# Patient Record
Sex: MALE | Race: WHITE | NOT HISPANIC OR LATINO | URBAN - METROPOLITAN AREA
[De-identification: names, ages, dates, MRNs, and addresses within clinical notes are randomized per-mention and may not be internally consistent; named-entity substitution may affect disease eponyms.]

---

## 2017-12-01 ENCOUNTER — APPOINTMENT (OUTPATIENT)
Dept: URBAN - METROPOLITAN AREA CLINIC 200 | Age: 67
Setting detail: DERMATOLOGY
End: 2017-12-01

## 2017-12-01 DIAGNOSIS — L57.0 ACTINIC KERATOSIS: ICD-10-CM

## 2017-12-01 DIAGNOSIS — L57.8 OTHER SKIN CHANGES DUE TO CHRONIC EXPOSURE TO NONIONIZING RADIATION: ICD-10-CM

## 2017-12-01 DIAGNOSIS — B07.8 OTHER VIRAL WARTS: ICD-10-CM

## 2017-12-01 PROBLEM — I10 ESSENTIAL (PRIMARY) HYPERTENSION: Status: ACTIVE | Noted: 2017-12-01

## 2017-12-01 PROCEDURE — OTHER COUNSELING: OTHER

## 2017-12-01 PROCEDURE — OTHER MIPS QUALITY: OTHER

## 2017-12-01 PROCEDURE — 17110 DESTRUCT B9 LESION 1-14: CPT

## 2017-12-01 PROCEDURE — OTHER LIQUID NITROGEN: OTHER

## 2017-12-01 PROCEDURE — 99213 OFFICE O/P EST LOW 20 MIN: CPT | Mod: 25

## 2017-12-01 PROCEDURE — 17000 DESTRUCT PREMALG LESION: CPT | Mod: 59

## 2017-12-01 PROCEDURE — 17003 DESTRUCT PREMALG LES 2-14: CPT

## 2017-12-01 ASSESSMENT — LOCATION SIMPLE DESCRIPTION DERM
LOCATION SIMPLE: LEFT CHEEK
LOCATION SIMPLE: RIGHT UPPER BACK
LOCATION SIMPLE: FRONTAL SCALP
LOCATION SIMPLE: LEFT PRETIBIAL REGION

## 2017-12-01 ASSESSMENT — LOCATION ZONE DERM
LOCATION ZONE: FACE
LOCATION ZONE: LEG
LOCATION ZONE: TRUNK
LOCATION ZONE: SCALP

## 2017-12-01 ASSESSMENT — PAIN INTENSITY VAS: HOW INTENSE IS YOUR PAIN 0 BEING NO PAIN, 10 BEING THE MOST SEVERE PAIN POSSIBLE?: NO PAIN

## 2017-12-01 ASSESSMENT — LOCATION DETAILED DESCRIPTION DERM
LOCATION DETAILED: RIGHT MEDIAL UPPER BACK
LOCATION DETAILED: LEFT PROXIMAL PRETIBIAL REGION
LOCATION DETAILED: LEFT SUPERIOR LATERAL MALAR CHEEK
LOCATION DETAILED: LEFT DISTAL PRETIBIAL REGION
LOCATION DETAILED: MEDIAL FRONTAL SCALP

## 2017-12-01 NOTE — PROCEDURE: MIPS QUALITY
Detail Level: Generalized
Quality 111:Pneumonia Vaccination Status For Older Adults: Pneumococcal Vaccination Previously Received

## 2017-12-01 NOTE — PROCEDURE: LIQUID NITROGEN
Post-Care Instructions: I reviewed with the patient in detail post-care instructions. Patient is to wear sunprotection, and avoid picking at any of the treated lesions. Pt may apply Vaseline to crusted or scabbing areas.
Detail Level: Detailed
Duration Of Freeze Thaw-Cycle (Seconds): 10
Render Post-Care Instructions In Note?: no
Consent: The patient's consent was obtained including but not limited to risks of crusting, scabbing, blistering, scarring, darker or lighter pigmentary change, recurrence, incomplete removal and infection.
Number Of Freeze-Thaw Cycles: 2 freeze-thaw cycles
Include Z78.9 (Other Specified Conditions Influencing Health Status) As An Associated Diagnosis?: Yes
Medical Necessity Information: It is in your best interest to select a reason for this procedure from the list below. All of these items fulfill various CMS LCD requirements except the new and changing color options.
Medical Necessity Clause: This procedure was medically necessary because the lesions that were treated were: causing pain

## 2018-12-14 ENCOUNTER — APPOINTMENT (OUTPATIENT)
Dept: URBAN - METROPOLITAN AREA CLINIC 200 | Age: 68
Setting detail: DERMATOLOGY
End: 2018-12-14

## 2018-12-14 DIAGNOSIS — L57.8 OTHER SKIN CHANGES DUE TO CHRONIC EXPOSURE TO NONIONIZING RADIATION: ICD-10-CM

## 2018-12-14 DIAGNOSIS — L21.8 OTHER SEBORRHEIC DERMATITIS: ICD-10-CM

## 2018-12-14 DIAGNOSIS — L57.0 ACTINIC KERATOSIS: ICD-10-CM

## 2018-12-14 DIAGNOSIS — B07.8 OTHER VIRAL WARTS: ICD-10-CM

## 2018-12-14 DIAGNOSIS — L72.0 EPIDERMAL CYST: ICD-10-CM

## 2018-12-14 PROBLEM — Z85.828 PERSONAL HISTORY OF OTHER MALIGNANT NEOPLASM OF SKIN: Status: ACTIVE | Noted: 2018-12-14

## 2018-12-14 PROCEDURE — 17110 DESTRUCT B9 LESION 1-14: CPT

## 2018-12-14 PROCEDURE — OTHER COUNSELING: OTHER

## 2018-12-14 PROCEDURE — 17003 DESTRUCT PREMALG LES 2-14: CPT

## 2018-12-14 PROCEDURE — 17000 DESTRUCT PREMALG LESION: CPT | Mod: 59

## 2018-12-14 PROCEDURE — OTHER REASSURANCE: OTHER

## 2018-12-14 PROCEDURE — OTHER PRESCRIPTION: OTHER

## 2018-12-14 PROCEDURE — OTHER MIPS QUALITY: OTHER

## 2018-12-14 PROCEDURE — 99213 OFFICE O/P EST LOW 20 MIN: CPT | Mod: 25

## 2018-12-14 PROCEDURE — OTHER LIQUID NITROGEN: OTHER

## 2018-12-14 RX ORDER — HYDROCORTISONE 25 MG/ML
LOTION TOPICAL
Qty: 1 | Refills: 6 | Status: ERX | COMMUNITY
Start: 2018-12-14

## 2018-12-14 ASSESSMENT — LOCATION SIMPLE DESCRIPTION DERM
LOCATION SIMPLE: RIGHT ANTERIOR NECK
LOCATION SIMPLE: LEFT CHEEK
LOCATION SIMPLE: LEFT EAR
LOCATION SIMPLE: CHEST

## 2018-12-14 ASSESSMENT — LOCATION DETAILED DESCRIPTION DERM
LOCATION DETAILED: LEFT INFERIOR HELIX
LOCATION DETAILED: LEFT LATERAL SUBMANDIBULAR CHEEK
LOCATION DETAILED: LEFT SUPERIOR CENTRAL MALAR CHEEK
LOCATION DETAILED: RIGHT CLAVICULAR NECK
LOCATION DETAILED: LEFT TRAGUS
LOCATION DETAILED: LEFT MEDIAL SUPERIOR CHEST

## 2018-12-14 ASSESSMENT — LOCATION ZONE DERM
LOCATION ZONE: TRUNK
LOCATION ZONE: NECK
LOCATION ZONE: FACE
LOCATION ZONE: EAR

## 2018-12-14 ASSESSMENT — SEVERITY ASSESSMENT: HOW SEVERE IS THIS PATIENT'S CONDITION?: ALMOST CLEAR

## 2018-12-14 NOTE — HPI: SKIN LESION
What Type Of Note Output Would You Prefer (Optional)?: Standard Output
How Severe Is Your Skin Lesion?: mild
Is This A New Presentation, Or A Follow-Up?: Moles

## 2018-12-14 NOTE — PROCEDURE: LIQUID NITROGEN
Consent: The patient's consent was obtained including but not limited to risks of crusting, scabbing, blistering, scarring, darker or lighter pigmentary change, recurrence, incomplete removal and infection.
Number Of Freeze-Thaw Cycles: 2 freeze-thaw cycles
Add 52 Modifier (Optional): no
Post-Care Instructions: I reviewed with the patient in detail post-care instructions. Patient is to wear sunprotection, and avoid picking at any of the treated lesions. Pt may apply Vaseline to crusted or scabbing areas.
Duration Of Freeze Thaw-Cycle (Seconds): 2
Pared With?: Dermablade
Detail Level: Detailed
Medical Necessity Information: It is in your best interest to select a reason for this procedure from the list below. All of these items fulfill various CMS LCD requirements except the new and changing color options.
Include Z78.9 (Other Specified Conditions Influencing Health Status) As An Associated Diagnosis?: Yes
Medical Necessity Clause: This procedure was medically necessary because the lesions that were treated were: causing pain

## 2019-12-13 ENCOUNTER — APPOINTMENT (OUTPATIENT)
Dept: URBAN - METROPOLITAN AREA CLINIC 200 | Age: 69
Setting detail: DERMATOLOGY
End: 2019-12-18

## 2019-12-13 DIAGNOSIS — Z85.828 PERSONAL HISTORY OF OTHER MALIGNANT NEOPLASM OF SKIN: ICD-10-CM

## 2019-12-13 DIAGNOSIS — Z12.83 ENCOUNTER FOR SCREENING FOR MALIGNANT NEOPLASM OF SKIN: ICD-10-CM

## 2019-12-13 DIAGNOSIS — L21.8 OTHER SEBORRHEIC DERMATITIS: ICD-10-CM

## 2019-12-13 DIAGNOSIS — L57.0 ACTINIC KERATOSIS: ICD-10-CM

## 2019-12-13 PROCEDURE — 17000 DESTRUCT PREMALG LESION: CPT

## 2019-12-13 PROCEDURE — 99213 OFFICE O/P EST LOW 20 MIN: CPT | Mod: 25

## 2019-12-13 PROCEDURE — OTHER MIPS QUALITY: OTHER

## 2019-12-13 PROCEDURE — OTHER COUNSELING: OTHER

## 2019-12-13 PROCEDURE — OTHER REASSURANCE: OTHER

## 2019-12-13 PROCEDURE — OTHER LIQUID NITROGEN: OTHER

## 2019-12-13 PROCEDURE — 17003 DESTRUCT PREMALG LES 2-14: CPT

## 2019-12-13 PROCEDURE — OTHER PRESCRIPTION: OTHER

## 2019-12-13 RX ORDER — CLOBETASOL PROPIONATE 0.5 MG/ML
SOLUTION TOPICAL
Qty: 1 | Refills: 11 | Status: ERX | COMMUNITY
Start: 2019-12-13

## 2019-12-13 ASSESSMENT — LOCATION DETAILED DESCRIPTION DERM
LOCATION DETAILED: LEFT LATERAL FOREHEAD
LOCATION DETAILED: LEFT INFERIOR ANTERIOR NECK
LOCATION DETAILED: RIGHT SUPERIOR PARIETAL SCALP
LOCATION DETAILED: LEFT CENTRAL FRONTAL SCALP
LOCATION DETAILED: LEFT INFERIOR LATERAL MALAR CHEEK
LOCATION DETAILED: RIGHT CENTRAL PARIETAL SCALP
LOCATION DETAILED: INFERIOR THORACIC SPINE

## 2019-12-13 ASSESSMENT — LOCATION ZONE DERM
LOCATION ZONE: SCALP
LOCATION ZONE: FACE
LOCATION ZONE: NECK
LOCATION ZONE: TRUNK

## 2019-12-13 ASSESSMENT — LOCATION SIMPLE DESCRIPTION DERM
LOCATION SIMPLE: LEFT ANTERIOR NECK
LOCATION SIMPLE: LEFT SCALP
LOCATION SIMPLE: LEFT FOREHEAD
LOCATION SIMPLE: SCALP
LOCATION SIMPLE: LEFT CHEEK
LOCATION SIMPLE: UPPER BACK

## 2019-12-13 ASSESSMENT — PAIN INTENSITY VAS: HOW INTENSE IS YOUR PAIN 0 BEING NO PAIN, 10 BEING THE MOST SEVERE PAIN POSSIBLE?: NO PAIN

## 2019-12-13 NOTE — PROCEDURE: MIPS QUALITY
Detail Level: Detailed
Quality 110: Preventive Care And Screening: Influenza Immunization: Influenza Immunization previously received during influenza season
Quality 474: Zoster Vaccination Status: Shingrix vaccine was not administered for reasons documented by clinician (e.g. patient administered vaccine other than Shingrix, patient allergy or other medical reasons, patient declined or other patient reasons, vaccine not available or other system reasons)
Additional Notes: Shingles SHINGRIX vaccine not administered due to patients personal or medical reasons. Patient declined to elaborate on those reasons.

## 2019-12-13 NOTE — PROCEDURE: LIQUID NITROGEN
Render Post-Care Instructions In Note?: no
Post-Care Instructions: I reviewed with the patient in detail post-care instructions. Patient is to wear sunprotection, and avoid picking at any of the treated lesions. Pt may apply Vaseline to crusted or scabbing areas.
Duration Of Freeze Thaw-Cycle (Seconds): 2
Number Of Freeze-Thaw Cycles: 1 freeze-thaw cycle
Detail Level: Detailed
Consent: The patient's consent was obtained including but not limited to risks of crusting, scabbing, blistering, scarring, darker or lighter pigmentary change, recurrence, incomplete removal and infection.

## 2020-12-15 ENCOUNTER — APPOINTMENT (OUTPATIENT)
Dept: URBAN - METROPOLITAN AREA CLINIC 200 | Age: 70
Setting detail: DERMATOLOGY
End: 2020-12-21

## 2020-12-15 DIAGNOSIS — Z85.828 PERSONAL HISTORY OF OTHER MALIGNANT NEOPLASM OF SKIN: ICD-10-CM

## 2020-12-15 DIAGNOSIS — L57.8 OTHER SKIN CHANGES DUE TO CHRONIC EXPOSURE TO NONIONIZING RADIATION: ICD-10-CM

## 2020-12-15 DIAGNOSIS — L90.5 SCAR CONDITIONS AND FIBROSIS OF SKIN: ICD-10-CM

## 2020-12-15 DIAGNOSIS — L57.0 ACTINIC KERATOSIS: ICD-10-CM

## 2020-12-15 DIAGNOSIS — T07XXXA ABRASION OR FRICTION BURN OF OTHER, MULTIPLE, AND UNSPECIFIED SITES, WITHOUT MENTION OF INFECTION: ICD-10-CM

## 2020-12-15 DIAGNOSIS — D485 NEOPLASM OF UNCERTAIN BEHAVIOR OF SKIN: ICD-10-CM

## 2020-12-15 PROBLEM — S60.811A ABRASION OF RIGHT WRIST, INITIAL ENCOUNTER: Status: ACTIVE | Noted: 2020-12-15

## 2020-12-15 PROBLEM — D48.5 NEOPLASM OF UNCERTAIN BEHAVIOR OF SKIN: Status: ACTIVE | Noted: 2020-12-15

## 2020-12-15 PROCEDURE — 11102 TANGNTL BX SKIN SINGLE LES: CPT

## 2020-12-15 PROCEDURE — OTHER REASSURANCE: OTHER

## 2020-12-15 PROCEDURE — OTHER COUNSELING: OTHER

## 2020-12-15 PROCEDURE — 17000 DESTRUCT PREMALG LESION: CPT | Mod: 59

## 2020-12-15 PROCEDURE — 99213 OFFICE O/P EST LOW 20 MIN: CPT | Mod: 25

## 2020-12-15 PROCEDURE — OTHER BIOPSY BY SHAVE METHOD: OTHER

## 2020-12-15 PROCEDURE — 17003 DESTRUCT PREMALG LES 2-14: CPT

## 2020-12-15 PROCEDURE — OTHER LIQUID NITROGEN: OTHER

## 2020-12-15 PROCEDURE — OTHER PHOTO-DOCUMENTATION: OTHER

## 2020-12-15 ASSESSMENT — LOCATION DETAILED DESCRIPTION DERM
LOCATION DETAILED: LEFT DISTAL LATERAL CALF
LOCATION DETAILED: LEFT SCAPHA
LOCATION DETAILED: PERIUMBILICAL SKIN
LOCATION DETAILED: RIGHT VENTRAL WRIST
LOCATION DETAILED: LEFT MEDIAL SUPERIOR CHEST
LOCATION DETAILED: RIGHT VENTRAL DISTAL FOREARM
LOCATION DETAILED: LEFT INFERIOR CENTRAL MALAR CHEEK

## 2020-12-15 ASSESSMENT — LOCATION SIMPLE DESCRIPTION DERM
LOCATION SIMPLE: ABDOMEN
LOCATION SIMPLE: LEFT EAR
LOCATION SIMPLE: CHEST
LOCATION SIMPLE: RIGHT WRIST
LOCATION SIMPLE: LEFT CHEEK
LOCATION SIMPLE: RIGHT FOREARM
LOCATION SIMPLE: LEFT CALF

## 2020-12-15 ASSESSMENT — LOCATION ZONE DERM
LOCATION ZONE: LEG
LOCATION ZONE: FACE
LOCATION ZONE: ARM
LOCATION ZONE: TRUNK
LOCATION ZONE: EAR

## 2020-12-15 NOTE — PROCEDURE: BIOPSY BY SHAVE METHOD
Size Of Lesion In Cm: 0
Billing Type: Third-Party Bill
Bill For Surgical Tray: no
Electrodesiccation And Curettage Text: The wound bed was treated with electrodesiccation and curettage after the biopsy was performed.
Detail Level: Detailed
Biopsy Method: sterile single edge surgical blade
Electrodesiccation Text: The wound bed was treated with electrodesiccation after the biopsy was performed.
Wound Care: Aquaphor
Type Of Destruction Used: Curettage
Consent: Written consent was obtained and risks were reviewed including but not limited to scarring, infection, bleeding, scabbing, incomplete removal, nerve damage and allergy to anesthesia.
Depth Of Biopsy: dermis
Cryotherapy Text: The wound bed was treated with cryotherapy after the biopsy was performed.
Notification Instructions: Patient will be notified of biopsy results. However, patient instructed to call the office if not contacted within 2 weeks.
Dressing: bandage
Curettage Text: The wound bed was treated with curettage after the biopsy was performed.
Anesthesia Volume In Cc (Will Not Render If 0): 0.5
Information: Selecting Yes will display possible errors in your note based on the variables you have selected. This validation is only offered as a suggestion for you. PLEASE NOTE THAT THE VALIDATION TEXT WILL BE REMOVED WHEN YOU FINALIZE YOUR NOTE. IF YOU WANT TO FAX A PRELIMINARY NOTE YOU WILL NEED TO TOGGLE THIS TO 'NO' IF YOU DO NOT WANT IT IN YOUR FAXED NOTE.
Post-Care Instructions: I reviewed with the patient in detail post-care instructions. Patient is to keep the biopsy site dry overnight, and then apply bacitracin twice daily until healed. Patient may apply hydrogen peroxide soaks to remove any crusting.
Was A Bandage Applied: Yes
Biopsy Type: H and E
Hemostasis: Drysol
Silver Nitrate Text: The wound bed was treated with silver nitrate after the biopsy was performed.

## 2020-12-15 NOTE — PROCEDURE: LIQUID NITROGEN
Number Of Freeze-Thaw Cycles: 1 freeze-thaw cycle
Detail Level: Detailed
Duration Of Freeze Thaw-Cycle (Seconds): 2
Consent: The patient's consent was obtained including but not limited to risks of crusting, scabbing, blistering, scarring, darker or lighter pigmentary change, recurrence, incomplete removal and infection.
Render Note In Bullet Format When Appropriate: No
Post-Care Instructions: I reviewed with the patient in detail post-care instructions. Patient is to wear sunprotection, and avoid picking at any of the treated lesions. Pt may apply Vaseline to crusted or scabbing areas.

## 2022-01-20 ENCOUNTER — APPOINTMENT (OUTPATIENT)
Dept: URBAN - METROPOLITAN AREA CLINIC 200 | Age: 72
Setting detail: DERMATOLOGY
End: 2022-01-23

## 2022-01-20 DIAGNOSIS — L30.0 NUMMULAR DERMATITIS: ICD-10-CM

## 2022-01-20 DIAGNOSIS — L57.8 OTHER SKIN CHANGES DUE TO CHRONIC EXPOSURE TO NONIONIZING RADIATION: ICD-10-CM

## 2022-01-20 DIAGNOSIS — Z11.52 ENCOUNTER FOR SCREENING FOR COVID-19: ICD-10-CM

## 2022-01-20 DIAGNOSIS — Z85.828 PERSONAL HISTORY OF OTHER MALIGNANT NEOPLASM OF SKIN: ICD-10-CM

## 2022-01-20 DIAGNOSIS — L82.1 OTHER SEBORRHEIC KERATOSIS: ICD-10-CM

## 2022-01-20 DIAGNOSIS — L57.0 ACTINIC KERATOSIS: ICD-10-CM

## 2022-01-20 PROBLEM — E78.5 HYPERLIPIDEMIA, UNSPECIFIED: Status: ACTIVE | Noted: 2022-01-20

## 2022-01-20 PROCEDURE — 17000 DESTRUCT PREMALG LESION: CPT

## 2022-01-20 PROCEDURE — OTHER LIQUID NITROGEN: OTHER

## 2022-01-20 PROCEDURE — OTHER PRESCRIPTION MEDICATION MANAGEMENT: OTHER

## 2022-01-20 PROCEDURE — OTHER SUNSCREEN RECOMMENDATIONS: OTHER

## 2022-01-20 PROCEDURE — OTHER PRESCRIPTION: OTHER

## 2022-01-20 PROCEDURE — OTHER MIPS QUALITY: OTHER

## 2022-01-20 PROCEDURE — 17003 DESTRUCT PREMALG LES 2-14: CPT

## 2022-01-20 PROCEDURE — 99213 OFFICE O/P EST LOW 20 MIN: CPT | Mod: 25

## 2022-01-20 PROCEDURE — OTHER SCREENING FOR COVID-19: OTHER

## 2022-01-20 PROCEDURE — OTHER REASSURANCE: OTHER

## 2022-01-20 PROCEDURE — OTHER COUNSELING: OTHER

## 2022-01-20 RX ORDER — TRIAMCINOLONE ACETONIDE 1 MG/G
CREAM TOPICAL
Qty: 80 | Refills: 6 | Status: ERX | COMMUNITY
Start: 2022-01-20

## 2022-01-20 ASSESSMENT — LOCATION DETAILED DESCRIPTION DERM
LOCATION DETAILED: RIGHT CENTRAL FRONTAL SCALP
LOCATION DETAILED: LEFT SCAPHA
LOCATION DETAILED: PERIUMBILICAL SKIN
LOCATION DETAILED: LEFT INFERIOR ANTERIOR NECK
LOCATION DETAILED: LEFT RADIAL DORSAL HAND
LOCATION DETAILED: LEFT MID-UPPER BACK

## 2022-01-20 ASSESSMENT — LOCATION ZONE DERM
LOCATION ZONE: SCALP
LOCATION ZONE: NECK
LOCATION ZONE: HAND
LOCATION ZONE: TRUNK
LOCATION ZONE: EAR

## 2022-01-20 ASSESSMENT — PAIN INTENSITY VAS: HOW INTENSE IS YOUR PAIN 0 BEING NO PAIN, 10 BEING THE MOST SEVERE PAIN POSSIBLE?: NO PAIN

## 2022-01-20 ASSESSMENT — SEVERITY ASSESSMENT: SEVERITY: MILD

## 2022-01-20 ASSESSMENT — LOCATION SIMPLE DESCRIPTION DERM
LOCATION SIMPLE: ABDOMEN
LOCATION SIMPLE: LEFT HAND
LOCATION SIMPLE: LEFT EAR
LOCATION SIMPLE: LEFT UPPER BACK
LOCATION SIMPLE: RIGHT SCALP
LOCATION SIMPLE: LEFT ANTERIOR NECK

## 2022-01-20 NOTE — PROCEDURE: LIQUID NITROGEN
Detail Level: Detailed
Render Post-Care Instructions In Note?: no
Number Of Freeze-Thaw Cycles: 1 freeze-thaw cycle
Post-Care Instructions: I reviewed with the patient in detail post-care instructions. Patient is to wear sunprotection, and avoid picking at any of the treated lesions. Pt may apply Vaseline to crusted or scabbing areas.
Consent: The patient's consent was obtained including but not limited to risks of crusting, scabbing, blistering, scarring, darker or lighter pigmentary change, recurrence, incomplete removal and infection.
Duration Of Freeze Thaw-Cycle (Seconds): 1
Show Applicator Variable?: Yes

## 2022-01-20 NOTE — PROCEDURE: PRESCRIPTION MEDICATION MANAGEMENT
Render In Strict Bullet Format?: No
Detail Level: Detailed
Initiate Treatment: triamcinolone acetonide 0.1 % topical cream

## 2022-05-13 ENCOUNTER — APPOINTMENT (OUTPATIENT)
Dept: URBAN - METROPOLITAN AREA CLINIC 200 | Age: 72
Setting detail: DERMATOLOGY
End: 2022-05-16

## 2022-05-13 DIAGNOSIS — D485 NEOPLASM OF UNCERTAIN BEHAVIOR OF SKIN: ICD-10-CM

## 2022-05-13 DIAGNOSIS — Z11.52 ENCOUNTER FOR SCREENING FOR COVID-19: ICD-10-CM

## 2022-05-13 PROBLEM — D48.5 NEOPLASM OF UNCERTAIN BEHAVIOR OF SKIN: Status: ACTIVE | Noted: 2022-05-13

## 2022-05-13 PROCEDURE — 69100 BIOPSY OF EXTERNAL EAR: CPT

## 2022-05-13 PROCEDURE — OTHER BIOPSY BY SHAVE METHOD: OTHER

## 2022-05-13 PROCEDURE — OTHER MIPS QUALITY: OTHER

## 2022-05-13 PROCEDURE — 11102 TANGNTL BX SKIN SINGLE LES: CPT | Mod: 59

## 2022-05-13 PROCEDURE — OTHER SCREENING FOR COVID-19: OTHER

## 2022-05-13 PROCEDURE — OTHER PHOTO-DOCUMENTATION: OTHER

## 2022-05-13 ASSESSMENT — LOCATION DETAILED DESCRIPTION DERM
LOCATION DETAILED: LEFT ANTIHELIX
LOCATION DETAILED: PERIUMBILICAL SKIN

## 2022-05-13 ASSESSMENT — LOCATION ZONE DERM
LOCATION ZONE: TRUNK
LOCATION ZONE: EAR

## 2022-05-13 ASSESSMENT — LOCATION SIMPLE DESCRIPTION DERM
LOCATION SIMPLE: LEFT EAR
LOCATION SIMPLE: ABDOMEN

## 2022-05-13 NOTE — PROCEDURE: BIOPSY BY SHAVE METHOD
Bill 32382 For Specimen Handling/Conveyance To Laboratory?: no
Additional Anesthesia Volume In Cc (Will Not Render If 0): 0
Information: Selecting Yes will display possible errors in your note based on the variables you have selected. This validation is only offered as a suggestion for you. PLEASE NOTE THAT THE VALIDATION TEXT WILL BE REMOVED WHEN YOU FINALIZE YOUR NOTE. IF YOU WANT TO FAX A PRELIMINARY NOTE YOU WILL NEED TO TOGGLE THIS TO 'NO' IF YOU DO NOT WANT IT IN YOUR FAXED NOTE.
Depth Of Biopsy: dermis
Validate Note Data (See Information Below): Yes
Billing Type: Third-Party Bill
Anesthesia Volume In Cc (Will Not Render If 0): 0.2
Hemostasis: Drysol
Biopsy Type: H and E
Dressing: bandage
Silver Nitrate Text: The wound bed was treated with silver nitrate after the biopsy was performed.
Electrodesiccation And Curettage Text: The wound bed was treated with electrodesiccation and curettage after the biopsy was performed.
Type Of Destruction Used: Curettage
Biopsy Method: Dermablade
Anesthesia Type: diphenhydramine
Electrodesiccation Text: The wound bed was treated with electrodesiccation after the biopsy was performed.
Notification Instructions: Patient will be notified of biopsy results. However, patient instructed to call the office if not contacted within 2 weeks.
Consent: Written consent was obtained and risks were reviewed including but not limited to scarring, infection, bleeding, scabbing, incomplete removal, nerve damage and allergy to anesthesia.
Wound Care: Aquaphor
Cryotherapy Text: The wound bed was treated with cryotherapy after the biopsy was performed.
Detail Level: Detailed
Post-Care Instructions: I reviewed with the patient in detail post-care instructions. Patient is to keep the biopsy site dry overnight, and then apply bacitracin twice daily until healed. Patient may apply hydrogen peroxide soaks to remove any crusting.
Curettage Text: The wound bed was treated with curettage after the biopsy was performed.

## 2022-06-23 ENCOUNTER — APPOINTMENT (OUTPATIENT)
Dept: URBAN - METROPOLITAN AREA CLINIC 200 | Age: 72
Setting detail: DERMATOLOGY
End: 2022-06-27

## 2022-06-23 DIAGNOSIS — Z11.52 ENCOUNTER FOR SCREENING FOR COVID-19: ICD-10-CM

## 2022-06-23 DIAGNOSIS — L57.0 ACTINIC KERATOSIS: ICD-10-CM

## 2022-06-23 PROCEDURE — OTHER LIQUID NITROGEN: OTHER

## 2022-06-23 PROCEDURE — OTHER MIPS QUALITY: OTHER

## 2022-06-23 PROCEDURE — OTHER SCREENING FOR COVID-19: OTHER

## 2022-06-23 PROCEDURE — 17000 DESTRUCT PREMALG LESION: CPT

## 2022-06-23 PROCEDURE — 17003 DESTRUCT PREMALG LES 2-14: CPT

## 2022-06-23 ASSESSMENT — LOCATION ZONE DERM
LOCATION ZONE: FACE
LOCATION ZONE: TRUNK
LOCATION ZONE: EAR

## 2022-06-23 ASSESSMENT — LOCATION DETAILED DESCRIPTION DERM
LOCATION DETAILED: PERIUMBILICAL SKIN
LOCATION DETAILED: LEFT SUPERIOR CENTRAL MALAR CHEEK
LOCATION DETAILED: LEFT CENTRAL MALAR CHEEK
LOCATION DETAILED: LEFT ANTIHELIX

## 2022-06-23 ASSESSMENT — LOCATION SIMPLE DESCRIPTION DERM
LOCATION SIMPLE: LEFT CHEEK
LOCATION SIMPLE: ABDOMEN
LOCATION SIMPLE: LEFT EAR

## 2022-06-23 ASSESSMENT — PAIN INTENSITY VAS: HOW INTENSE IS YOUR PAIN 0 BEING NO PAIN, 10 BEING THE MOST SEVERE PAIN POSSIBLE?: NO PAIN

## 2023-01-09 ENCOUNTER — APPOINTMENT (OUTPATIENT)
Dept: URBAN - METROPOLITAN AREA CLINIC 203 | Age: 73
Setting detail: DERMATOLOGY
End: 2023-01-15

## 2023-01-09 DIAGNOSIS — Z85.828 PERSONAL HISTORY OF OTHER MALIGNANT NEOPLASM OF SKIN: ICD-10-CM

## 2023-01-09 DIAGNOSIS — L57.8 OTHER SKIN CHANGES DUE TO CHRONIC EXPOSURE TO NONIONIZING RADIATION: ICD-10-CM

## 2023-01-09 DIAGNOSIS — D485 NEOPLASM OF UNCERTAIN BEHAVIOR OF SKIN: ICD-10-CM

## 2023-01-09 PROBLEM — D48.5 NEOPLASM OF UNCERTAIN BEHAVIOR OF SKIN: Status: ACTIVE | Noted: 2023-01-09

## 2023-01-09 PROCEDURE — OTHER SUNSCREEN RECOMMENDATIONS: OTHER

## 2023-01-09 PROCEDURE — OTHER BIOPSY BY SHAVE METHOD: OTHER

## 2023-01-09 PROCEDURE — 99213 OFFICE O/P EST LOW 20 MIN: CPT | Mod: 25

## 2023-01-09 PROCEDURE — OTHER PHOTO-DOCUMENTATION: OTHER

## 2023-01-09 PROCEDURE — 11102 TANGNTL BX SKIN SINGLE LES: CPT

## 2023-01-09 PROCEDURE — OTHER MIPS QUALITY: OTHER

## 2023-01-09 PROCEDURE — OTHER COUNSELING: OTHER

## 2023-01-09 ASSESSMENT — LOCATION SIMPLE DESCRIPTION DERM
LOCATION SIMPLE: LEFT CHEEK
LOCATION SIMPLE: CHEST
LOCATION SIMPLE: ABDOMEN

## 2023-01-09 ASSESSMENT — LOCATION DETAILED DESCRIPTION DERM
LOCATION DETAILED: LEFT MEDIAL INFERIOR CHEST
LOCATION DETAILED: LEFT INFERIOR MEDIAL MALAR CHEEK
LOCATION DETAILED: PERIUMBILICAL SKIN
LOCATION DETAILED: EPIGASTRIC SKIN

## 2023-01-09 ASSESSMENT — PAIN INTENSITY VAS: HOW INTENSE IS YOUR PAIN 0 BEING NO PAIN, 10 BEING THE MOST SEVERE PAIN POSSIBLE?: NO PAIN

## 2023-01-09 ASSESSMENT — LOCATION ZONE DERM
LOCATION ZONE: TRUNK
LOCATION ZONE: FACE

## 2023-07-10 ENCOUNTER — APPOINTMENT (OUTPATIENT)
Dept: URBAN - METROPOLITAN AREA CLINIC 203 | Age: 73
Setting detail: DERMATOLOGY
End: 2023-07-15

## 2023-07-10 DIAGNOSIS — Z85.828 PERSONAL HISTORY OF OTHER MALIGNANT NEOPLASM OF SKIN: ICD-10-CM

## 2023-07-10 DIAGNOSIS — L20.89 OTHER ATOPIC DERMATITIS: ICD-10-CM

## 2023-07-10 DIAGNOSIS — L57.8 OTHER SKIN CHANGES DUE TO CHRONIC EXPOSURE TO NONIONIZING RADIATION: ICD-10-CM

## 2023-07-10 DIAGNOSIS — B07.8 OTHER VIRAL WARTS: ICD-10-CM

## 2023-07-10 PROCEDURE — 17110 DESTRUCT B9 LESION 1-14: CPT

## 2023-07-10 PROCEDURE — OTHER LIQUID NITROGEN: OTHER

## 2023-07-10 PROCEDURE — OTHER PRESCRIPTION MEDICATION MANAGEMENT: OTHER

## 2023-07-10 PROCEDURE — OTHER COUNSELING: OTHER

## 2023-07-10 PROCEDURE — OTHER SUNSCREEN RECOMMENDATIONS: OTHER

## 2023-07-10 PROCEDURE — OTHER PRESCRIPTION: OTHER

## 2023-07-10 PROCEDURE — 99213 OFFICE O/P EST LOW 20 MIN: CPT | Mod: 25

## 2023-07-10 RX ORDER — TRIAMCINOLONE ACETONIDE 1 MG/G
CREAM TOPICAL BID PRN
Qty: 30 | Refills: 2 | Status: ERX

## 2023-07-10 ASSESSMENT — LOCATION DETAILED DESCRIPTION DERM
LOCATION DETAILED: RIGHT SUPERIOR MEDIAL MALAR CHEEK
LOCATION DETAILED: LEFT MEDIAL INFERIOR CHEST
LOCATION DETAILED: LEFT LATERAL DISTAL PRETIBIAL REGION
LOCATION DETAILED: EPIGASTRIC SKIN
LOCATION DETAILED: PERIUMBILICAL SKIN
LOCATION DETAILED: RIGHT DISTAL PRETIBIAL REGION

## 2023-07-10 ASSESSMENT — LOCATION SIMPLE DESCRIPTION DERM
LOCATION SIMPLE: CHEST
LOCATION SIMPLE: LEFT PRETIBIAL REGION
LOCATION SIMPLE: ABDOMEN
LOCATION SIMPLE: RIGHT CHEEK
LOCATION SIMPLE: RIGHT PRETIBIAL REGION

## 2023-07-10 ASSESSMENT — LOCATION ZONE DERM
LOCATION ZONE: TRUNK
LOCATION ZONE: LEG
LOCATION ZONE: FACE

## 2023-07-10 NOTE — PROCEDURE: LIQUID NITROGEN
Render Post-Care Instructions In Note?: no
Show Spray Paint Technique Variable?: Yes
Detail Level: Detailed
Post-Care Instructions: I reviewed with the patient in detail post-care instructions. Patient is to wear sunprotection, and avoid picking at any of the treated lesions. Pt may apply Vaseline to crusted or scabbing areas.
Spray Paint Text: The liquid nitrogen was applied to the skin utilizing a spray paint frosting technique.
Medical Necessity Clause: This procedure was medically necessary because the lesions that were treated were:
Medical Necessity Information: It is in your best interest to select a reason for this procedure from the list below. All of these items fulfill various CMS LCD requirements except the new and changing color options.
Consent: The patient's consent was obtained including but not limited to risks of crusting, scabbing, blistering, scarring, darker or lighter pigmentary change, recurrence, incomplete removal and infection.

## 2023-11-13 ENCOUNTER — APPOINTMENT (OUTPATIENT)
Dept: URBAN - METROPOLITAN AREA CLINIC 203 | Age: 73
Setting detail: DERMATOLOGY
End: 2023-11-20

## 2023-11-13 DIAGNOSIS — L57.0 ACTINIC KERATOSIS: ICD-10-CM

## 2023-11-13 DIAGNOSIS — D485 NEOPLASM OF UNCERTAIN BEHAVIOR OF SKIN: ICD-10-CM

## 2023-11-13 PROBLEM — D48.5 NEOPLASM OF UNCERTAIN BEHAVIOR OF SKIN: Status: ACTIVE | Noted: 2023-11-13

## 2023-11-13 PROCEDURE — 17003 DESTRUCT PREMALG LES 2-14: CPT

## 2023-11-13 PROCEDURE — 11102 TANGNTL BX SKIN SINGLE LES: CPT

## 2023-11-13 PROCEDURE — OTHER LIQUID NITROGEN: OTHER

## 2023-11-13 PROCEDURE — OTHER BIOPSY BY SHAVE METHOD: OTHER

## 2023-11-13 PROCEDURE — 17000 DESTRUCT PREMALG LESION: CPT | Mod: 59

## 2023-11-13 ASSESSMENT — LOCATION SIMPLE DESCRIPTION DERM
LOCATION SIMPLE: RIGHT CHEEK
LOCATION SIMPLE: LEFT CHEEK
LOCATION SIMPLE: CHEST

## 2023-11-13 ASSESSMENT — LOCATION DETAILED DESCRIPTION DERM
LOCATION DETAILED: STERNUM
LOCATION DETAILED: LEFT INFERIOR CENTRAL MALAR CHEEK
LOCATION DETAILED: RIGHT CENTRAL BUCCAL CHEEK

## 2023-11-13 ASSESSMENT — LOCATION ZONE DERM
LOCATION ZONE: TRUNK
LOCATION ZONE: FACE

## 2023-11-13 NOTE — PROCEDURE: LIQUID NITROGEN
Post-Care Instructions: I reviewed with the patient in detail post-care instructions. Patient is to wear sunprotection, and avoid picking at any of the treated lesions. Pt may apply Vaseline to crusted or scabbing areas.
Application Tool (Optional): Liquid Nitrogen Sprayer
Number Of Freeze-Thaw Cycles: 2 freeze-thaw cycles
Duration Of Freeze Thaw-Cycle (Seconds): 5
Consent: The patient's consent was obtained including but not limited to risks of crusting, scabbing, blistering, scarring, darker or lighter pigmentary change, recurrence, incomplete removal and infection.
Show Aperture Variable?: Yes
Render Post-Care Instructions In Note?: no
Detail Level: Zone

## 2024-05-06 ENCOUNTER — APPOINTMENT (OUTPATIENT)
Dept: URBAN - METROPOLITAN AREA CLINIC 203 | Age: 74
Setting detail: DERMATOLOGY
End: 2024-05-08

## 2024-05-06 DIAGNOSIS — L57.0 ACTINIC KERATOSIS: ICD-10-CM

## 2024-05-06 DIAGNOSIS — B35.6 TINEA CRURIS: ICD-10-CM

## 2024-05-06 DIAGNOSIS — L57.8 OTHER SKIN CHANGES DUE TO CHRONIC EXPOSURE TO NONIONIZING RADIATION: ICD-10-CM

## 2024-05-06 DIAGNOSIS — Z85.828 PERSONAL HISTORY OF OTHER MALIGNANT NEOPLASM OF SKIN: ICD-10-CM

## 2024-05-06 DIAGNOSIS — L56.5 DISSEMINATED SUPERFICIAL ACTINIC POROKERATOSIS (DSAP): ICD-10-CM

## 2024-05-06 PROCEDURE — OTHER LIQUID NITROGEN: OTHER

## 2024-05-06 PROCEDURE — OTHER COUNSELING: OTHER

## 2024-05-06 PROCEDURE — OTHER PRESCRIPTION MEDICATION MANAGEMENT: OTHER

## 2024-05-06 PROCEDURE — 99213 OFFICE O/P EST LOW 20 MIN: CPT | Mod: 25

## 2024-05-06 PROCEDURE — OTHER SUNSCREEN RECOMMENDATIONS: OTHER

## 2024-05-06 PROCEDURE — 17000 DESTRUCT PREMALG LESION: CPT

## 2024-05-06 PROCEDURE — 17003 DESTRUCT PREMALG LES 2-14: CPT

## 2024-05-06 PROCEDURE — OTHER PRESCRIPTION: OTHER

## 2024-05-06 RX ORDER — ECONAZOLE NITRATE 10 MG/G
CREAM TOPICAL BID
Qty: 85 | Refills: 11 | Status: ERX | COMMUNITY
Start: 2024-05-06

## 2024-05-06 ASSESSMENT — LOCATION DETAILED DESCRIPTION DERM
LOCATION DETAILED: LEFT LATERAL MALAR CHEEK
LOCATION DETAILED: LEFT MEDIAL INFERIOR CHEST
LOCATION DETAILED: LEFT INFERIOR CENTRAL MALAR CHEEK
LOCATION DETAILED: RIGHT MEDIAL KNEE
LOCATION DETAILED: PERIUMBILICAL SKIN
LOCATION DETAILED: RIGHT CENTRAL BUCCAL CHEEK
LOCATION DETAILED: RIGHT INFERIOR CENTRAL MALAR CHEEK
LOCATION DETAILED: EPIGASTRIC SKIN
LOCATION DETAILED: RIGHT LATERAL MALAR CHEEK
LOCATION DETAILED: SUPRAPUBIC SKIN

## 2024-05-06 ASSESSMENT — LOCATION ZONE DERM
LOCATION ZONE: LEG
LOCATION ZONE: FACE
LOCATION ZONE: TRUNK

## 2024-05-06 ASSESSMENT — LOCATION SIMPLE DESCRIPTION DERM
LOCATION SIMPLE: RIGHT KNEE
LOCATION SIMPLE: RIGHT CHEEK
LOCATION SIMPLE: CHEST
LOCATION SIMPLE: GROIN
LOCATION SIMPLE: ABDOMEN
LOCATION SIMPLE: LEFT CHEEK

## 2024-05-06 ASSESSMENT — SEVERITY ASSESSMENT: SEVERITY: MILD TO MODERATE

## 2024-05-06 NOTE — PROCEDURE: LIQUID NITROGEN
Detail Level: Detailed
Show Aperture Variable?: Yes
Consent: The patient's consent was obtained including but not limited to risks of crusting, scabbing, blistering, scarring, darker or lighter pigmentary change, recurrence, incomplete removal and infection.
Render Post-Care Instructions In Note?: no
Number Of Freeze-Thaw Cycles: 2 freeze-thaw cycles
Post-Care Instructions: I reviewed with the patient in detail post-care instructions. Patient is to wear sunprotection, and avoid picking at any of the treated lesions. Pt may apply Vaseline to crusted or scabbing areas.
Duration Of Freeze Thaw-Cycle (Seconds): 5
Application Tool (Optional): Liquid Nitrogen Sprayer
Detail Level: Zone

## 2025-05-09 ENCOUNTER — PRE-ADMISSION TESTING (OUTPATIENT)
Dept: PREADMISSION TESTING | Age: 75
End: 2025-05-09
Payer: COMMERCIAL

## 2025-05-09 VITALS — BODY MASS INDEX: 37.77 KG/M2 | HEIGHT: 66 IN | WEIGHT: 235 LBS

## 2025-05-09 RX ORDER — TIRZEPATIDE 2.5 MG/.5ML
2.5 INJECTION, SOLUTION SUBCUTANEOUS
COMMUNITY
End: 2025-05-14 | Stop reason: ENTERED-IN-ERROR

## 2025-05-09 RX ORDER — METFORMIN HYDROCHLORIDE 500 MG/1
500 TABLET ORAL
Status: ON HOLD | COMMUNITY

## 2025-05-09 RX ORDER — TERAZOSIN 2 MG/1
6 CAPSULE ORAL NIGHTLY
Status: ON HOLD | COMMUNITY

## 2025-05-09 RX ORDER — EZETIMIBE 10 MG/1
10 TABLET ORAL DAILY
Status: ON HOLD | COMMUNITY

## 2025-05-09 RX ORDER — OLMESARTAN MEDOXOMIL, AMLODIPINE AND HYDROCHLOROTHIAZIDE TABLET 40/10/25 MG 40; 10; 25 MG/1; MG/1; MG/1
1 TABLET ORAL DAILY
COMMUNITY
End: 2025-05-14 | Stop reason: ENTERED-IN-ERROR

## 2025-05-09 RX ORDER — INSULIN GLARGINE 100 [IU]/ML
18 INJECTION, SOLUTION SUBCUTANEOUS NIGHTLY
Status: ON HOLD | COMMUNITY

## 2025-05-09 RX ORDER — DULOXETIN HYDROCHLORIDE 60 MG/1
60 CAPSULE, DELAYED RELEASE ORAL NIGHTLY
Status: ON HOLD | COMMUNITY

## 2025-05-14 ENCOUNTER — PRE-ADMISSION TESTING (OUTPATIENT)
Dept: PREADMISSION TESTING | Facility: HOSPITAL | Age: 75
End: 2025-05-14
Attending: ORTHOPAEDIC SURGERY
Payer: COMMERCIAL

## 2025-05-14 ENCOUNTER — TRANSCRIBE ORDERS (OUTPATIENT)
Dept: REGISTRATION | Facility: HOSPITAL | Age: 75
End: 2025-05-14

## 2025-05-14 ENCOUNTER — APPOINTMENT (OUTPATIENT)
Dept: LAB | Facility: HOSPITAL | Age: 75
End: 2025-05-14
Attending: ORTHOPAEDIC SURGERY
Payer: COMMERCIAL

## 2025-05-14 ENCOUNTER — HOSPITAL ENCOUNTER (OUTPATIENT)
Dept: CARDIOLOGY | Facility: HOSPITAL | Age: 75
Discharge: HOME | End: 2025-05-14
Attending: ORTHOPAEDIC SURGERY
Payer: COMMERCIAL

## 2025-05-14 VITALS
DIASTOLIC BLOOD PRESSURE: 76 MMHG | OXYGEN SATURATION: 95 % | SYSTOLIC BLOOD PRESSURE: 119 MMHG | HEART RATE: 70 BPM | RESPIRATION RATE: 20 BRPM | WEIGHT: 232 LBS | HEIGHT: 66 IN | TEMPERATURE: 98.6 F | BODY MASS INDEX: 37.28 KG/M2

## 2025-05-14 DIAGNOSIS — I35.0 AORTIC STENOSIS, MODERATE: ICD-10-CM

## 2025-05-14 DIAGNOSIS — N18.31 STAGE 3A CHRONIC KIDNEY DISEASE (CMS/HCC): ICD-10-CM

## 2025-05-14 DIAGNOSIS — M17.12 DEGENERATIVE ARTHRITIS OF LEFT KNEE: ICD-10-CM

## 2025-05-14 DIAGNOSIS — M17.12 DEGENERATIVE ARTHRITIS OF LEFT KNEE: Primary | ICD-10-CM

## 2025-05-14 DIAGNOSIS — Z01.818 ENCOUNTER FOR PREADMISSION TESTING: Primary | ICD-10-CM

## 2025-05-14 DIAGNOSIS — G47.33 OBSTRUCTIVE SLEEP APNEA: ICD-10-CM

## 2025-05-14 DIAGNOSIS — E78.2 MIXED HYPERLIPIDEMIA: ICD-10-CM

## 2025-05-14 DIAGNOSIS — M17.12 PRIMARY OSTEOARTHRITIS OF LEFT KNEE: ICD-10-CM

## 2025-05-14 DIAGNOSIS — N18.31 TYPE 2 DIABETES MELLITUS WITH STAGE 3A CHRONIC KIDNEY DISEASE, WITH LONG-TERM CURRENT USE OF INSULIN (CMS/HCC): ICD-10-CM

## 2025-05-14 DIAGNOSIS — N40.1 BPH WITH URINARY OBSTRUCTION: ICD-10-CM

## 2025-05-14 DIAGNOSIS — Z79.4 TYPE 2 DIABETES MELLITUS WITH STAGE 3A CHRONIC KIDNEY DISEASE, WITH LONG-TERM CURRENT USE OF INSULIN (CMS/HCC): ICD-10-CM

## 2025-05-14 DIAGNOSIS — I10 ESSENTIAL HYPERTENSION: ICD-10-CM

## 2025-05-14 DIAGNOSIS — E87.1 HYPONATREMIA: ICD-10-CM

## 2025-05-14 DIAGNOSIS — N13.8 BPH WITH URINARY OBSTRUCTION: ICD-10-CM

## 2025-05-14 DIAGNOSIS — E11.22 TYPE 2 DIABETES MELLITUS WITH STAGE 3A CHRONIC KIDNEY DISEASE, WITH LONG-TERM CURRENT USE OF INSULIN (CMS/HCC): ICD-10-CM

## 2025-05-14 PROBLEM — Z91.89 AT RISK FOR OBSTRUCTIVE SLEEP APNEA: Status: ACTIVE | Noted: 2025-05-14

## 2025-05-14 LAB
ABO + RH BLD: NORMAL
APTT PPP: 30 SEC (ref 23–35)
BLD GP AB SCN SERPL QL: NEGATIVE
BLOOD BANK CMNT PATIENT-IMP: NORMAL
D AG BLD QL: POSITIVE
ERYTHROCYTE [DISTWIDTH] IN BLOOD BY AUTOMATED COUNT: 11.9 % (ref 11.6–14.4)
HCT VFR BLD AUTO: 36.2 % (ref 40.1–51)
HGB BLD-MCNC: 13.2 G/DL (ref 13.7–17.5)
INR PPP: 1
LABORATORY COMMENT REPORT: NORMAL
MCH RBC QN AUTO: 29.3 PG (ref 28–33.2)
MCHC RBC AUTO-ENTMCNC: 36.5 G/DL (ref 32.2–36.5)
MCV RBC AUTO: 80.4 FL (ref 83–98)
PLATELET # BLD AUTO: 222 K/UL (ref 150–350)
PMV BLD AUTO: 8.5 FL (ref 9.4–12.4)
PROTHROMBIN TIME: 13.1 SEC (ref 12.2–14.5)
RBC # BLD AUTO: 4.5 M/UL (ref 4.5–5.8)
SPECIMEN EXP DATE BLD: NORMAL
WBC # BLD AUTO: 9 K/UL (ref 3.8–10.5)

## 2025-05-14 PROCEDURE — 85027 COMPLETE CBC AUTOMATED: CPT

## 2025-05-14 PROCEDURE — 86900 BLOOD TYPING SEROLOGIC ABO: CPT

## 2025-05-14 PROCEDURE — 99204 OFFICE O/P NEW MOD 45 MIN: CPT | Performed by: INTERNAL MEDICINE

## 2025-05-14 PROCEDURE — 36415 COLL VENOUS BLD VENIPUNCTURE: CPT

## 2025-05-14 PROCEDURE — 85730 THROMBOPLASTIN TIME PARTIAL: CPT

## 2025-05-14 PROCEDURE — 85610 PROTHROMBIN TIME: CPT

## 2025-05-14 RX ORDER — FUROSEMIDE 20 MG/1
20 TABLET ORAL EVERY MORNING
Status: ON HOLD | COMMUNITY
Start: 2025-05-13

## 2025-05-14 RX ORDER — AMLODIPINE AND OLMESARTAN MEDOXOMIL 10; 40 MG/1; MG/1
1 TABLET ORAL EVERY MORNING
Status: ON HOLD | COMMUNITY
Start: 2025-05-13

## 2025-05-14 RX ORDER — LATANOPROSTENE BUNOD 0.24 MG/ML
1 SOLUTION/ DROPS OPHTHALMIC NIGHTLY
Status: ON HOLD | COMMUNITY

## 2025-05-14 NOTE — H&P (VIEW-ONLY)
Division of [facility] ([facility])  Pre-Operative Consultation       Patient Name: Abad Hinkle  Reason for Referral: Pre-Operative Evaluation    Date of surgery / Surgical Procedure / Surgeon:  # 6/2/25  Right total knee arthroplasty  Dr. Blaise Huber    Other Physicians/Providers:      PCP: Fly Patel PA C  Cardiology: Dr. Mg Guidry @ [facility]       HISTORY OF THE PRESENT ILLNESS     Abad Hinkle is a 74 y.o. male presenting today to the [facility] for pre-operative evaluation prior to planned surgery.    Left knee pain for 5-6 years. It's been getting worse. His whole body locks up at night, and he needs to warm up/stretch in the morning. Everything is stiff. Experienced failure of conservative treatment, hence proceeding with surgical management. Chronic Narcotic Use > 90 days : No     Focused ROS:  The patient denies any current or recent chest pain or pressure, dyspnea, cough, sputum, fevers, chills, abdominal pain, nausea, vomiting, diarrhea or other symptoms.     Functional capacity:  Functionally, the patient is able to ascend a flight of stairs (has a flight of stairs).   The patient can walk on flat ground for a block's distance without CP or SOB. He sometimes gets out of breath when he exerts himself like carrying out the trash can.   Meets at least 4 METs.    Additional pertinent medical history noted with more details in problem list:  HTN: olmesartan-amlodipine 40-10mg, terazosin 6mg HS  DM 2: empagliflozin 25mg daily, MFM 500mg BID, mounjaro 2.5mg weekly, lantus 18 units HS  CKD 3: empagliflozin  HLD: ezetimibe 10mg  BPH with LUTS: terazosin  Aortic stenosis (moderate)  Chronic pain: Duloxetine  RAFAEL: no CPAP    The patient denies, on specific questioning, the following (except those mentioned):  No history of MI, arrhythmia, or CHF  No personal or family history of DVT/PE  No history of COPD or asthma  No history of TIA  or CVA  No history of seizure  No history of bleeding disorder  No history of difficult airway/difficult intubation  No personal or family history of malignant hyperthermia  No history of adverse reaction to anesthesia in the past    STOP-Bang Total Score: 5     PAST MEDICAL AND SURGICAL HISTORY     Past Medical History:   Diagnosis Date    Back pain     BPH (benign prostatic hyperplasia)     Chronic kidney disease, stage 3a (CMS/HCC)     Fall 04/2025    pt has fallen twice in the last month    Frequent urination     Hypertension     Lumbar herniated disc     Mitral valve stenosis and aortic valve stenosis     Neck pain     Pneumonia 2020    RSV infection 2023    Type 2 diabetes mellitus (CMS/HCC)     Vertigo 2023       Past Surgical History   Procedure Laterality Date    Back surgery  2003    Colonoscopy      Other surgical history Right 2008    fx tib/fib had plate placed. ( [place]        MEDICATIONS     Current Outpatient Medications:     amlodipine-olmesartan (THANG) 10-40 mg per tablet, Take 1 tablet by mouth every morning., Disp: , Rfl:     LASIX 20 mg tablet, Take 20 mg by mouth every morning. For 3 days, Disp: , Rfl:     DULoxetine (CYMBALTA) 60 mg capsule, Take 60 mg by mouth nightly., Disp: , Rfl:     empagliflozin (JARDIANCE) 25 mg tablet, Take 25 mg by mouth every morning., Disp: , Rfl:     ezetimibe (ZETIA) 10 mg tablet, Take 10 mg by mouth daily., Disp: , Rfl:     insulin glargine U-100 (LANTUS/BASAGLAR) 100 unit/mL (3 mL) pen, Inject 18 Units under the skin nightly., Disp: , Rfl:     metFORMIN (GLUCOPHAGE) 500 mg tablet, Take 500 mg by mouth 2 (two) times a day with breakfast and dinner., Disp: , Rfl:     terazosin (HYTRIN) 2 mg capsule, Take 6 mg by mouth nightly., Disp: , Rfl:     VYZULTA 0.024 % drops ophthalmic drops, Administer 1 drop into both eyes nightly., Disp: , Rfl:      ALLERGIES   Beta-blockers (beta-adrenergic blocking agts), House dust mite, Adhesive tape-silicones, and  "Latex     FAMILY HISTORY   family history is not on file.     SOCIAL HISTORY   Social History     Tobacco Use    Smoking status: Never    Smokeless tobacco: Never   Substance Use Topics    Alcohol use: Not Currently    Drug use: Never        REVIEW OF SYSTEMS     All other ROS reviewed and are negative except as noted in the HPI     PHYSICAL EXAM     Visit Vitals  /76   Pulse 70   Temp 37 °C (98.6 °F) (Oral)   Resp 20   Ht 1.676 m (5' 6\")   Wt 105 kg (232 lb)   SpO2 95%   BMI 37.45 kg/m²     Body mass index is 37.45 kg/m².    Physical Exam  Constitutional: Awake, resting comfortably, no acute distress  HENT: normocephalic, atraumatic, hearing intact  Eyes: EOMI, no conjunctival injection  CV: RRR, S1+S2, no murmurs rubs gallops  Resp: Clear to auscultation bilaterally, no wheeze/rales/rhonchi  GI: BS present.   MSK: No edema  Neuro: alert and oriented x3, moves all extremities     LABS, EKG   Labs  I have reviewed the patient's labs to the time of note. No new clinical concern.    Lab Results   Component Value Date    WBC 9.00 05/14/2025    HGB 13.2 (L) 05/14/2025    HCT 36.2 (L) 05/14/2025     05/14/2025    INR 1.0 05/14/2025       ECG  Personally reviewed   NSR, no acute ST-T changes     ASSESSMENT AND PLAN        Assessment & Plan  Encounter for preadmission testing  Preop CV risk assessment as completed by his cardiologist. No further testing is planned and he is to proceed with surgery.   See hyponatremia   Primary osteoarthritis of left knee  6/2/25  left total knee arthroplasty  Dr. Blaise Huber    Preop NSAIDs at discretion of the surgeon  Post operative management as per surgical service  DVT ppx at discretion of surgical service  Early ambulation, PT OT  Incentive kermit  If present, early bates removal as soon as possible  Pain management, judicious use of opioids, bowel regimen  Monitor CBC, BMP, and volume status in the perioperative period  Hyponatremia  He had labs done 5/12 and his Na was " "127.   He received a call from his doctor's office and was instructed to stop his combination medication (olmesartan-amlodipine-hctz) and is seeing his nephrologist, Dr. Max Hernández, this week and getting repeat labs done. He has been newly prescribed olmesartan-amlodipine with lasix 20mg x 3 days.   Will defer BMP today given it has only been 1 day since stopping his medication. He will need clearance from his nephrologist after repeating labs with him.   BPH with urinary obstruction  Due to his pre-existing condition of BPH, the patient has an increased risk of urinary retention post-operatively.    Monitor for urine retention in the perioperative period.  Continue terazosin  Essential hypertension  Elevated today. However, this is in the setting of olmesartan-amlodipine-hctz 40-10-25mg being discontinued this week due to his Na dropping to 127. He is getting started on new rx: olmesartan-amlodipine 40-10mg instead. He will need to hold this medication the morning of surgery.   terazosin 6mg HS  Timing of resuming home med regimen will depend on BP, volume status, renal function in the post op period.   Mixed hyperlipidemia  Continue ezetimibe 10mg HS  Obstructive sleep apnea  Sleep apnea places the patient at relatively increased risk (compared to a population without this diagnosis) of oxygen desaturation, cardiac events, acute respiratory failure, or an ICU transfer.     In the post op period: recommend use of our sleep apnea protocol in the PACU, pulse ox monitoring, consider extubate to BIPAP.  Cautious use of sedating medications and narcotics.    Stage 3a chronic kidney disease (CMS/[facility])  No results found for: \"CREATININE\", \"EGFR\"  April 2025: Cr 1.45, GFR 51  May 2025: Cr 1.36, GFR 55  Due to the pre-existing condition of CKD, the patient has an increased risk of acute kidney injury post-operatively.     Recommend dosing medications appropriately for the patient's GFR / CrCl  Avoid intraoperative " "hypotension  Avoid nephrotoxins  Avoiding NSAID use as feasible  Monitor renal function and volume status in the post op period    Type 2 diabetes mellitus with stage 3a chronic kidney disease, with long-term current use of insulin (CMS/[facility])  A1c 7.1 on 4/9/25    Preoperatively:  empagliflozin 25mg daily, hold for 3 days prior to surgery  MFM 500mg BID, hold on morning of surgery  mounjaro 2.5mg weekly, hold for 7 days prior to surgery  lantus 18 units HS, cut down to 10 units the night prior to surgery    Postoperatively:  Glucose management post-operatively will be per the patient's inpatient medical teams, acceptable goal ~180.   POCT glucose AC and HS  SSI  Lantus to be reordered evening of POD 0 but will need to see how he feels/BS trends to see dose to be resumed.   Monitor BS and PO intake to decide on resuming her prior insulin regimen.   When resuming diet: 7452-4308 kcal consistent carb + no concentrated sweets restriction  Aortic stenosis, moderate  Per cardiology note:  \"Most recent echo from 1/2024 demonstrated EF 60-65%, aortic valve has moderate stenosis.\"        _______________________  Mague Davies MD  Internal Medicine  Division of [facility]      "

## 2025-05-28 NOTE — DISCHARGE INSTRUCTIONS
[address] [place] [id number] [phone]  Fax [phone]  [employer]    POST-OPERATIVE INSTRUCTIONS and INFORMATION  TOTAL KNEE REPLACEMENT  Blaise Huber MD  Chanell Aquino, [facility]  [phone]    Congratulations!  You've made a big step! Our focus now is on your recovery and getting you back to a healthy, happy, pain-free lifestyle. Every day, you will find you can do more and more. As you go through your recovery, do not get discouraged!  It is tough early on and you are literally teaching yourself to walk normally again!  The most important thing is to work on your stretching! Below are answers to some frequently asked questions:    1. ACTIVITY/REHABILITATION/PHYSICALTHERAPY (PT)  Now that your knee replacement surgery is complete, it is time to focus moving your new knee(s). Your main goal is to get up and be active! Simply walking as much as you can and working on range of motion (bending and straightening) will allow for healing and let you progress! The vast majority of patients go home the morning after surgery. You will not need inpatient rehab, home nurses, or home physical therapists unless you are having significant issues or are deconditioned. If you do have post-operative needs, the hospital  will work with you prior to discharge to make the appropriate arrangements.  Remember, we simply want you to be able to safely get around your home and WALK!      The first couple of weeks are about healing - so don't overdo it - just work on your stretches. We are learning that 'taking it easy' for the first couple of weeks after surgery may have better results than doing intense outpatient PT right after surgery. If you avoid overdoing it, you will have less pain and swelling.  It is important, however, that you move your knee so it does not get stiff.     At your first post-op appointment, your knee should be able to bend from 0  to 90 degrees!    Do the following:    Move your knee: straighten and bend (see the attachments) for 1 minute every hour!.   Walk as much as you can without overdoing it.  Take time to elevate your legs (see attachment).    Every day you will feel more stable and need less assistance. You should plan to advance to a cane sometime in the first 2 weeks - you can do this whenever you feel comfortable. If necessary, outpatient physical therapy typically begins after your first post-op appointment, two weeks after surgery. You will be given a prescription for therapy at that appointment. Incidentally, rehabbing on your own is not only doable, but most motivated patients can actually rehab better on their own with less pain.    2. SWELLING   It is NORMAL for your lower extremity to swell after surgery.  If you are having a lot of swelling, you must spend more time elevating your leg well above your heart (see final page of handout). We recommend elevating your leg four to five times a day on 4 or 5 pillows for 15-30 minutes at a time. Stick with a consistent routine and know it will take time (often months) for swelling to decrease.    3. MEDICINES  You will be given prescriptions at discharge. Let us know if you have any drug allergies. Your hospital nurse will review your medicines upon discharge.    1. Pain Medicine: Typically, you will be given a prescription for one of the narcotics oxycodone, hydrocodone, or tramadol.  Take this as necessary, but wean off of it to Tylenol/acetaminophen as soon as you feel comfortable.  You should notice that you need less narcotic pain medicine as each day passes. It is important, however, to have adequate pain control so that you can participate in PT and perform your exercises. Proper pain control will make your recovery easier and faster! Do NOT drive while you are taking narcotic pain medicines.  Stop taking the pain medicine if you become dizzy or disoriented.  Constipation is  a major side effect of narcotics and many patients will avoid narcotics as they prefer to deal with a bit of pain rather than constipation.  If you stop taking the pain medicine, you can typically stop taking the stool softener.    Multi-Modal Pain Management: It has been shown that taking different types of pain medicines can relieve pain better (with less side effects) than taking narcotics alone.  So, unless you have any contraindication, in addition to the narcotic, you can also take Tylenol (acetaminophen) 1,000mg up to 3x per day (as long as it is not already included with your narcotic medicine). In addition, if you are not taking blood thinners such as Eliquis, Warfarin, Xarelto, or Lovenox, you can take Ibuprofen or Motrin 400mg-600mg up to 4x per day with food.  It's a good idea to protect your stomach by taking Omeprazole 20mg once a day prior to eating.  None of the medicines require a prescription and they can be picked up at any pharmacy.    2. Stool Softener (usually Colace):  This helps to avoid constipation caused by the other medications.  Take this 2 times per day for 2-4 weeks, or as long as you are taking the narcotics.  If you are not constipated or you experience diarrhea, stop taking the Colace!     3. Blood Thinners: Aspirin, Coumadin (warfarin), Eliquis (apixaban) or Lovenox (enoxaparin) - you should only be on one of these medicines.  Blood thinners help to reduce the risk of blood clots. It is fine to take a multivitamin, but if you are taking Coumadin, do not take vitamin K. Please remember that the most important way to reduce the risk of clot formation is to get up and get walking!    Please DO NOT leave the hospital without a clear understanding of your instructions regarding blood thinners!    ASPIRIN/ECOTRIN:   MOST PATIENTS are discharged on aspirin after surgery. If you are discharged on aspirin, take 81 mg twice a day for 4 weeks.     ELIQUIS (Apixaban)  This may be prescribed if  you were taking this previously or are at a very high risk for clotting. This medication can cause excessive bleeding, so if your incision is bleeding or you notice bleeding elsewhere, please discontinue Eliquis and call the office.    COUMADIN (Warfarin):  If you were taking Coumadin before surgery, you will have your cardiologist monitor your INR and adjust your Coumadin dose, just as you normally do.      LOVENOX (Enoxaparin):   If you are discharged on Lovenox your will give yourself this injection until you are instructed to stop.    Other Blood Thinners:   Some blood thinners are too risky to use right after surgery due to high risk for bleeding (such as Xarelto and Pradaxa). If you were taking one of these before surgery, DO NOT restart it at this time! Instead, you will get clear instructions on which medicines to take over the next few weeks and you will resume your previous blood thinner after that.    4. SURGICAL DRESSING/INCISION  There usually are no staples or stitches that need to be removed from your incision.  There are deep sutures under the skin that will dissolve over time.  These sutures cause the skin edges to be prominent initially, but will flatten out over time as they dissolve. A special dressing was placed over your wound at the time of surgery.  This protects your wound and can remain on for one week.  Remove the dressing 7 days after your surgery.  This has been proven to reduce the risk of infection. Blood spotting is normal, but should be contained in the padding of the dressing. You can shower with the dressing in place and when it is removed next week, you can shower and simply use soap and water to clean the incision.     5. SLEEPING   We are sorry, but it is NORMAL to have difficulty sleeping after orthopaedic surgery.  It may take 2 months until you are sleeping normally. Elevating your leg throughout the day will reduce swelling that builds up at night - this can help you get a  better night's rest. Benadryl, Melatonin, or other over-the counter sleep aids may be helpful.    6. SLEEPING POSITION No Restrictions regarding position.  When lying on your side, you may place a pillow between your knees for comfort, if necessary. During the first month, avoid placing a pillow under your knee when sleeping as this may cause your knee to stiffen in a bent position.    7. DRIVING  You must have advanced to a cane, stopped narcotics, and feel comfortable and safe to drive! If it was your left knee, you may be ready after 1-2 weeks. For the right knee, it usually takes much longer. You should drive only if you feel safe! If in doubt, call us.     8. INFECTION PRECAUTIONS  You will need to take an antibiotic approximately one hour before any dental procedure. You will be given a prescription at your first post-op appointment. I recommend continuing this practice forever.    9. FLYING/TRIPS    By six weeks after surgery, you should be able to travel. Prior to that, it is likely that it will be very uncomfortable, especially for longer trips. We recommend to plan your travel accordingly. During any long trip we suggest you get up, stretch your legs, and walk around periodically. Special cards stating that you have a knee replacement are no longer needed at the airport TSA.    10. DISABILITY FORMS  Please visit our website at [email or web address] to submit your FMLA/Disability forms electronically and complete the electronic release of information form.     For technical support call: [phone] or email: [email or web address]    11. POST-OP APPOINTMENT   Your first follow up appointment is in 2 weeks and was made when you scheduled your surgery. Please call Chanell at [phone] if you need to confirm or change your appointment. Please call at any time if you experience worsening pain, inability to weight bear, new numbness/tingling or weakness, wound redness or drainage, bleeding,  loss of motion, fevers > 101 F, or chills. Never hesitate to call or come in if you have a concern!    12. EXPECTATIONS  There are not many orthopaedic surgeries that use the extremity immediately after it was operated on. Often you would be required to us a sling or brace, be non-weight bearing, etc. to allow for healing before you use it.  However, if we limited the use of or immobilized your new knee, the rate of complications would increase dramatically.  So, to help avoid complications, we have you use your brand new joint right away.  This is good, but it puts an enormous amount of stress on your new joint before it is healed.  This is a major factor between activity and healing - these next couple of months are a balance between your activity and pain/swelling with the healing of your new joint.  We want you to be active, but don't overdo it!     LONG TERM: After 3 months you can perform any activity you feel comfortable doing.  I recommend waiting 6 months before performing any demanding activities such as skiing, horseback riding, sporting competition, etc.  For most people, kneeling will not be comfortable.  There is no contraindication to kneeling and most can do it with a cushion or knee pads.  Most people will get back to doing every activity they were doing before surgery, and feeling better when they do it.  Over the next 3-6 months, you will find the strength, stamina, and stability of your knee will improve.  What will NOT improve  after 3 months is the flexibility (range of motion) of your new knee.  That is why it is so important to focus on stretching.  If you achieve 0°-90° (putting your knee flat and bending to a right angle) by your first post op visit, it is highly likely that you will achieve full flexibility 0°-120° within 3 months.    REMEMBER, at your first post-op appointment your knee should   bend from 0 to 90 degrees!          ENJOY YOUR NEW KNEE!                Chair Slide    This is  the most important exercise to get your knee to bend properly!            Sit back in a chair with arm rests.      Slide your foot back and bend your knee as much as possible.        Keep your knee in the bent position with your foot on the ground.    Slide your bottom forward on the chair and lean into your knee.        Feel the stretch! Count to 10, rest for a moment, then repeat!    For best results, DO THIS FOR 1 MINUTE EVERY HOUR YOU ARE AWAKE.            Step Lunge    This can also help get your knee bending!    Place foot from same side as knee replacement on a step stool or bottom stair.  Make sure you hold onto a walker, cane or railing for support.   Push your knee forward aiming out over your big toe.   KEY POINT: Focus on pushing your knee and hips forward, DO NOT lean forward at the waist.  Feel the stretch and count to 10 - rest a moment - repeat!                Knee Extension Stretch    Knee extension stretching will get your leg flat!            Sit forward on the edge of a chair.  Place your heel on the top of a step or a stool.  Place both hands on your knee just above your kneecap and hold/push your knee down.  Hold/push you knee down and lean forward!  Feel the stretch in the back of your knee!  Count to 10; then rest for a moment; repeat  For best results, DO THIS FOR 1 MINUTE EVERY HOUR YOU ARE AWAKE.            Elevate your legs!    Swelling will occur after surgery  Keep it to a minimum:    4-5 times per day  15-30 minutes at a time  Lie down flat on your back  Put both legs up on 4-5 pillows  Keep your knees straight  Elevating like this allows gravity to help drain your legs of excess fluid

## 2025-06-02 ENCOUNTER — APPOINTMENT (OUTPATIENT)
Dept: RADIOLOGY | Facility: HOSPITAL | Age: 75
End: 2025-06-02
Attending: NURSE PRACTITIONER
Payer: COMMERCIAL

## 2025-06-02 ENCOUNTER — ANESTHESIA EVENT (OUTPATIENT)
Dept: OPERATING ROOM | Facility: HOSPITAL | Age: 75
End: 2025-06-02
Payer: COMMERCIAL

## 2025-06-02 ENCOUNTER — ANESTHESIA (OUTPATIENT)
Dept: OPERATING ROOM | Facility: HOSPITAL | Age: 75
End: 2025-06-02
Payer: COMMERCIAL

## 2025-06-02 ENCOUNTER — HOSPITAL ENCOUNTER (INPATIENT)
Facility: HOSPITAL | Age: 75
LOS: 1 days | Discharge: NURSING, REHAB OR LONG-TERM CARE | End: 2025-06-05
Attending: ORTHOPAEDIC SURGERY | Admitting: ORTHOPAEDIC SURGERY
Payer: COMMERCIAL

## 2025-06-02 DIAGNOSIS — Z96.652 S/P TOTAL KNEE ARTHROPLASTY, LEFT: ICD-10-CM

## 2025-06-02 DIAGNOSIS — Z96.652 HISTORY OF TOTAL LEFT KNEE REPLACEMENT: Primary | ICD-10-CM

## 2025-06-02 LAB
GLUCOSE BLD-MCNC: 105 MG/DL (ref 70–99)
GLUCOSE BLD-MCNC: 125 MG/DL (ref 70–99)
GLUCOSE BLD-MCNC: 126 MG/DL (ref 70–99)
GLUCOSE BLD-MCNC: 141 MG/DL (ref 70–99)
POCT TEST: ABNORMAL

## 2025-06-02 PROCEDURE — 36000016 HC OR LEVEL 6 EA ADDL MIN: Performed by: ORTHOPAEDIC SURGERY

## 2025-06-02 PROCEDURE — 0SRD0JA REPLACEMENT OF LEFT KNEE JOINT WITH SYNTHETIC SUBSTITUTE, UNCEMENTED, OPEN APPROACH: ICD-10-PCS | Performed by: ORTHOPAEDIC SURGERY

## 2025-06-02 PROCEDURE — 27200000 HC STERILE SUPPLY: Performed by: ORTHOPAEDIC SURGERY

## 2025-06-02 PROCEDURE — 63600000 HC DRUGS/DETAIL CODE: Mod: JZ | Performed by: NURSE PRACTITIONER

## 2025-06-02 PROCEDURE — 37000010 HC ANESTHESIA SPINAL: Performed by: ORTHOPAEDIC SURGERY

## 2025-06-02 PROCEDURE — 63600000 HC DRUGS/DETAIL CODE: Mod: JZ | Performed by: ANESTHESIOLOGY

## 2025-06-02 PROCEDURE — 36000006 HC OR LEVEL 6 INITIAL 30MIN: Performed by: ORTHOPAEDIC SURGERY

## 2025-06-02 PROCEDURE — 63700000 HC SELF-ADMINISTRABLE DRUG: Performed by: NURSE PRACTITIONER

## 2025-06-02 PROCEDURE — 25000000 HC PHARMACY GENERAL: Performed by: NURSE PRACTITIONER

## 2025-06-02 PROCEDURE — 63600000 HC DRUGS/DETAIL CODE: Performed by: NURSE PRACTITIONER

## 2025-06-02 PROCEDURE — 63600000 HC DRUGS/DETAIL CODE: Mod: JZ

## 2025-06-02 PROCEDURE — 71000011 HC PACU PHASE 1 EA ADDL MIN: Performed by: ORTHOPAEDIC SURGERY

## 2025-06-02 PROCEDURE — 73560 X-RAY EXAM OF KNEE 1 OR 2: CPT | Mod: LT

## 2025-06-02 PROCEDURE — 25000000 HC PHARMACY GENERAL: Mod: JW | Performed by: NURSE ANESTHETIST, CERTIFIED REGISTERED

## 2025-06-02 PROCEDURE — 63600000 HC DRUGS/DETAIL CODE: Mod: JW | Performed by: NURSE ANESTHETIST, CERTIFIED REGISTERED

## 2025-06-02 PROCEDURE — 71000001 HC PACU PHASE 1 INITIAL 30MIN: Performed by: ORTHOPAEDIC SURGERY

## 2025-06-02 PROCEDURE — 25000000 HC PHARMACY GENERAL

## 2025-06-02 PROCEDURE — C1776 JOINT DEVICE (IMPLANTABLE): HCPCS | Performed by: ORTHOPAEDIC SURGERY

## 2025-06-02 PROCEDURE — 25800000 HC PHARMACY IV SOLUTIONS: Performed by: NURSE PRACTITIONER

## 2025-06-02 PROCEDURE — 99222 1ST HOSP IP/OBS MODERATE 55: CPT | Mod: FS | Performed by: INTERNAL MEDICINE

## 2025-06-02 PROCEDURE — 63600000 HC DRUGS/DETAIL CODE: Mod: JZ | Performed by: ORTHOPAEDIC SURGERY

## 2025-06-02 DEVICE — FEMORAL COMP SZ 6/LEFT POST STAB TRIATHLON: Type: IMPLANTABLE DEVICE | Site: KNEE | Status: FUNCTIONAL

## 2025-06-02 DEVICE — *T* TIBIAL COMP SZ 6 TRIATHLON TRITANIUM: Type: IMPLANTABLE DEVICE | Site: KNEE | Status: FUNCTIONAL

## 2025-06-02 DEVICE — INSERT TRIATHLON PS X3 TIBIAL ETO: Type: IMPLANTABLE DEVICE | Site: KNEE | Status: FUNCTIONAL

## 2025-06-02 DEVICE — IMP PATELLA SYMMETRIC SZ S36/10MM TRITANIUM: Type: IMPLANTABLE DEVICE | Site: KNEE | Status: FUNCTIONAL

## 2025-06-02 RX ORDER — TRANEXAMIC ACID 10 MG/ML
1000 INJECTION, SOLUTION INTRAVENOUS ONCE
Status: COMPLETED | OUTPATIENT
Start: 2025-06-02 | End: 2025-06-02

## 2025-06-02 RX ORDER — ONDANSETRON 4 MG/1
4 TABLET, ORALLY DISINTEGRATING ORAL EVERY 8 HOURS PRN
Status: DISCONTINUED | OUTPATIENT
Start: 2025-06-02 | End: 2025-06-05 | Stop reason: HOSPADM

## 2025-06-02 RX ORDER — INSULIN LISPRO 100 [IU]/ML
6-10 INJECTION, SOLUTION INTRAVENOUS; SUBCUTANEOUS
Status: DISCONTINUED | OUTPATIENT
Start: 2025-06-02 | End: 2025-06-05 | Stop reason: HOSPADM

## 2025-06-02 RX ORDER — DIPHENHYDRAMINE HCL 50 MG/ML
25 VIAL (ML) INJECTION EVERY 6 HOURS PRN
Status: DISCONTINUED | OUTPATIENT
Start: 2025-06-02 | End: 2025-06-05 | Stop reason: HOSPADM

## 2025-06-02 RX ORDER — HYDROMORPHONE HYDROCHLORIDE 1 MG/ML
INJECTION, SOLUTION INTRAMUSCULAR; INTRAVENOUS; SUBCUTANEOUS
Status: DISPENSED
Start: 2025-06-02 | End: 2025-06-03

## 2025-06-02 RX ORDER — ADHESIVE BANDAGE 7/8"
15-30 BANDAGE TOPICAL AS NEEDED
Status: DISCONTINUED | OUTPATIENT
Start: 2025-06-02 | End: 2025-06-05 | Stop reason: HOSPADM

## 2025-06-02 RX ORDER — SODIUM CHLORIDE, SODIUM LACTATE, POTASSIUM CHLORIDE, CALCIUM CHLORIDE 600; 310; 30; 20 MG/100ML; MG/100ML; MG/100ML; MG/100ML
INJECTION, SOLUTION INTRAVENOUS CONTINUOUS
Status: DISCONTINUED | OUTPATIENT
Start: 2025-06-02 | End: 2025-06-02

## 2025-06-02 RX ORDER — VANCOMYCIN/0.9 % SOD CHLORIDE 1.5G/250ML
1.5 PLASTIC BAG, INJECTION (ML) INTRAVENOUS
Status: COMPLETED | OUTPATIENT
Start: 2025-06-02 | End: 2025-06-02

## 2025-06-02 RX ORDER — METFORMIN HYDROCHLORIDE 500 MG/1
500 TABLET ORAL
Status: DISCONTINUED | OUTPATIENT
Start: 2025-06-02 | End: 2025-06-05 | Stop reason: HOSPADM

## 2025-06-02 RX ORDER — TIZANIDINE 2 MG/1
2 TABLET ORAL EVERY 8 HOURS PRN
Status: DISCONTINUED | OUTPATIENT
Start: 2025-06-02 | End: 2025-06-05 | Stop reason: HOSPADM

## 2025-06-02 RX ORDER — INSULIN GLARGINE 100 [IU]/ML
18 INJECTION, SOLUTION SUBCUTANEOUS NIGHTLY
Status: DISCONTINUED | OUTPATIENT
Start: 2025-06-02 | End: 2025-06-05 | Stop reason: HOSPADM

## 2025-06-02 RX ORDER — DIPHENHYDRAMINE HCL 25 MG
25 CAPSULE ORAL EVERY 6 HOURS PRN
Status: DISCONTINUED | OUTPATIENT
Start: 2025-06-02 | End: 2025-06-05 | Stop reason: HOSPADM

## 2025-06-02 RX ORDER — DEXTROSE 50 % IN WATER (D50W) INTRAVENOUS SYRINGE
25 AS NEEDED
Status: DISCONTINUED | OUTPATIENT
Start: 2025-06-02 | End: 2025-06-05 | Stop reason: HOSPADM

## 2025-06-02 RX ORDER — ONDANSETRON HYDROCHLORIDE 2 MG/ML
4 INJECTION, SOLUTION INTRAVENOUS EVERY 8 HOURS PRN
Status: DISCONTINUED | OUTPATIENT
Start: 2025-06-02 | End: 2025-06-05 | Stop reason: HOSPADM

## 2025-06-02 RX ORDER — LIDOCAINE HYDROCHLORIDE 20 MG/ML
INJECTION, SOLUTION EPIDURAL; INFILTRATION; INTRACAUDAL; PERINEURAL AS NEEDED
Status: DISCONTINUED | OUTPATIENT
Start: 2025-06-02 | End: 2025-06-02 | Stop reason: SURG

## 2025-06-02 RX ORDER — TIZANIDINE 2 MG/1
TABLET ORAL
Status: DISPENSED
Start: 2025-06-02 | End: 2025-06-03

## 2025-06-02 RX ORDER — HYDROMORPHONE HYDROCHLORIDE 1 MG/ML
0.5 INJECTION, SOLUTION INTRAMUSCULAR; INTRAVENOUS; SUBCUTANEOUS EVERY 10 MIN PRN
Status: DISCONTINUED | OUTPATIENT
Start: 2025-06-02 | End: 2025-06-02 | Stop reason: HOSPADM

## 2025-06-02 RX ORDER — SODIUM CHLORIDE 9 MG/ML
INJECTION, SOLUTION INTRAVENOUS CONTINUOUS
Status: ACTIVE | OUTPATIENT
Start: 2025-06-02 | End: 2025-06-03

## 2025-06-02 RX ORDER — DULOXETIN HYDROCHLORIDE 60 MG/1
60 CAPSULE, DELAYED RELEASE ORAL NIGHTLY
Status: DISCONTINUED | OUTPATIENT
Start: 2025-06-02 | End: 2025-06-05 | Stop reason: HOSPADM

## 2025-06-02 RX ORDER — PANTOPRAZOLE SODIUM 40 MG/1
40 TABLET, DELAYED RELEASE ORAL
Status: DISCONTINUED | OUTPATIENT
Start: 2025-06-03 | End: 2025-06-05 | Stop reason: HOSPADM

## 2025-06-02 RX ORDER — OXYCODONE HYDROCHLORIDE 5 MG/1
5 TABLET ORAL EVERY 4 HOURS PRN
Status: DISCONTINUED | OUTPATIENT
Start: 2025-06-02 | End: 2025-06-03

## 2025-06-02 RX ORDER — SODIUM CHLORIDE, SODIUM LACTATE, POTASSIUM CHLORIDE, CALCIUM CHLORIDE 600; 310; 30; 20 MG/100ML; MG/100ML; MG/100ML; MG/100ML
INJECTION, SOLUTION INTRAVENOUS CONTINUOUS
Status: ACTIVE | OUTPATIENT
Start: 2025-06-02 | End: 2025-06-02

## 2025-06-02 RX ORDER — OXYCODONE HYDROCHLORIDE 5 MG/1
10 TABLET ORAL EVERY 4 HOURS PRN
Status: DISCONTINUED | OUTPATIENT
Start: 2025-06-02 | End: 2025-06-03

## 2025-06-02 RX ORDER — LATANOPROST 50 UG/ML
1 SOLUTION/ DROPS OPHTHALMIC NIGHTLY
Status: DISCONTINUED | OUTPATIENT
Start: 2025-06-02 | End: 2025-06-05 | Stop reason: HOSPADM

## 2025-06-02 RX ORDER — ACETAMINOPHEN 325 MG/1
650 TABLET ORAL
Status: COMPLETED | OUTPATIENT
Start: 2025-06-02 | End: 2025-06-04

## 2025-06-02 RX ORDER — PROPOFOL 10 MG/ML
INJECTION, EMULSION INTRAVENOUS AS NEEDED
Status: DISCONTINUED | OUTPATIENT
Start: 2025-06-02 | End: 2025-06-02 | Stop reason: SURG

## 2025-06-02 RX ORDER — MORPHINE SULFATE 2 MG/ML
2 INJECTION, SOLUTION INTRAMUSCULAR; INTRAVENOUS EVERY 4 HOURS PRN
Status: DISCONTINUED | OUTPATIENT
Start: 2025-06-02 | End: 2025-06-04

## 2025-06-02 RX ORDER — PHENYLEPHRINE HCL IN 0.9% NACL 1 MG/10 ML
SYRINGE (ML) INTRAVENOUS
Status: DISPENSED
Start: 2025-06-02 | End: 2025-06-02

## 2025-06-02 RX ORDER — FENTANYL CITRATE 50 UG/ML
50 INJECTION, SOLUTION INTRAMUSCULAR; INTRAVENOUS EVERY 5 MIN PRN
Status: DISCONTINUED | OUTPATIENT
Start: 2025-06-02 | End: 2025-06-02 | Stop reason: HOSPADM

## 2025-06-02 RX ORDER — BUPIVACAINE HYDROCHLORIDE 2.5 MG/ML
INJECTION, SOLUTION EPIDURAL; INFILTRATION; INTRACAUDAL; PERINEURAL
Status: DISCONTINUED | OUTPATIENT
Start: 2025-06-02 | End: 2025-06-02 | Stop reason: HOSPADM

## 2025-06-02 RX ORDER — MIDAZOLAM HYDROCHLORIDE 2 MG/2ML
INJECTION, SOLUTION INTRAMUSCULAR; INTRAVENOUS AS NEEDED
Status: DISCONTINUED | OUTPATIENT
Start: 2025-06-02 | End: 2025-06-02 | Stop reason: SURG

## 2025-06-02 RX ORDER — ONDANSETRON HYDROCHLORIDE 2 MG/ML
4 INJECTION, SOLUTION INTRAVENOUS
Status: DISCONTINUED | OUTPATIENT
Start: 2025-06-02 | End: 2025-06-02 | Stop reason: HOSPADM

## 2025-06-02 RX ORDER — DEXTROSE 40 %
15-30 GEL (GRAM) ORAL AS NEEDED
Status: DISCONTINUED | OUTPATIENT
Start: 2025-06-02 | End: 2025-06-05 | Stop reason: HOSPADM

## 2025-06-02 RX ORDER — ACETAMINOPHEN 325 MG/1
975 TABLET ORAL ONCE
Status: COMPLETED | OUTPATIENT
Start: 2025-06-02 | End: 2025-06-02

## 2025-06-02 RX ORDER — NAPROXEN SODIUM 220 MG/1
81 TABLET, FILM COATED ORAL 2 TIMES DAILY
Status: DISCONTINUED | OUTPATIENT
Start: 2025-06-02 | End: 2025-06-05 | Stop reason: HOSPADM

## 2025-06-02 RX ORDER — FENTANYL CITRATE 50 UG/ML
INJECTION, SOLUTION INTRAMUSCULAR; INTRAVENOUS
Status: COMPLETED
Start: 2025-06-02 | End: 2025-06-02

## 2025-06-02 RX ORDER — HYDROMORPHONE HYDROCHLORIDE 1 MG/ML
INJECTION, SOLUTION INTRAMUSCULAR; INTRAVENOUS; SUBCUTANEOUS
Status: COMPLETED
Start: 2025-06-02 | End: 2025-06-02

## 2025-06-02 RX ORDER — AMOXICILLIN 250 MG
1 CAPSULE ORAL 2 TIMES DAILY
Status: DISCONTINUED | OUTPATIENT
Start: 2025-06-02 | End: 2025-06-05 | Stop reason: HOSPADM

## 2025-06-02 RX ORDER — POLYETHYLENE GLYCOL 3350 17 G/17G
17 POWDER, FOR SOLUTION ORAL DAILY
Status: DISCONTINUED | OUTPATIENT
Start: 2025-06-02 | End: 2025-06-05 | Stop reason: HOSPADM

## 2025-06-02 RX ORDER — EPHEDRINE SULFATE 50 MG/ML
INJECTION, SOLUTION INTRAVENOUS AS NEEDED
Status: DISCONTINUED | OUTPATIENT
Start: 2025-06-02 | End: 2025-06-02 | Stop reason: SURG

## 2025-06-02 RX ORDER — PHENYLEPHRINE HYDROCHLORIDE 10 MG/ML
INJECTION INTRAVENOUS AS NEEDED
Status: DISCONTINUED | OUTPATIENT
Start: 2025-06-02 | End: 2025-06-02 | Stop reason: SURG

## 2025-06-02 RX ORDER — FENTANYL CITRATE 50 UG/ML
INJECTION, SOLUTION INTRAMUSCULAR; INTRAVENOUS AS NEEDED
Status: DISCONTINUED | OUTPATIENT
Start: 2025-06-02 | End: 2025-06-02 | Stop reason: SURG

## 2025-06-02 RX ORDER — TRANEXAMIC ACID 10 MG/ML
INJECTION, SOLUTION INTRAVENOUS
Status: COMPLETED
Start: 2025-06-02 | End: 2025-06-02

## 2025-06-02 RX ORDER — ALUMINUM HYDROXIDE, MAGNESIUM HYDROXIDE, AND SIMETHICONE 1200; 120; 1200 MG/30ML; MG/30ML; MG/30ML
30 SUSPENSION ORAL EVERY 4 HOURS PRN
Status: DISCONTINUED | OUTPATIENT
Start: 2025-06-02 | End: 2025-06-05 | Stop reason: HOSPADM

## 2025-06-02 RX ORDER — BUPIVACAINE HYDROCHLORIDE 7.5 MG/ML
INJECTION INTRAVENOUS
Status: COMPLETED | OUTPATIENT
Start: 2025-06-02 | End: 2025-06-02

## 2025-06-02 RX ADMIN — TERAZOSIN HYDROCHLORIDE 6 MG: 1 CAPSULE ORAL at 21:16

## 2025-06-02 RX ADMIN — TRANEXAMIC ACID 1000 MG: 100 INJECTION, SOLUTION INTRAVENOUS at 10:20

## 2025-06-02 RX ADMIN — LATANOPROST 1 DROP: 50 SOLUTION OPHTHALMIC at 21:04

## 2025-06-02 RX ADMIN — FENTANYL CITRATE 50 MCG: 50 INJECTION INTRAMUSCULAR; INTRAVENOUS at 14:18

## 2025-06-02 RX ADMIN — SODIUM CHLORIDE: 9 INJECTION, SOLUTION INTRAVENOUS at 17:42

## 2025-06-02 RX ADMIN — PHENYLEPHRINE HYDROCHLORIDE 150 MCG: 10 INJECTION INTRAVENOUS at 11:08

## 2025-06-02 RX ADMIN — TIZANIDINE 2 MG: 2 TABLET ORAL at 15:41

## 2025-06-02 RX ADMIN — POVIDONE-IODINE 1 EACH: 5 SOLUTION TOPICAL at 09:22

## 2025-06-02 RX ADMIN — PHENYLEPHRINE HYDROCHLORIDE 100 MCG: 10 INJECTION INTRAVENOUS at 11:30

## 2025-06-02 RX ADMIN — ACETAMINOPHEN 975 MG: 325 TABLET ORAL at 09:22

## 2025-06-02 RX ADMIN — EPHEDRINE SULFATE 10 MG: 50 INJECTION, SOLUTION INTRAVENOUS at 10:40

## 2025-06-02 RX ADMIN — TRANEXAMIC ACID 1000 MG: 10 INJECTION, SOLUTION INTRAVENOUS at 12:20

## 2025-06-02 RX ADMIN — POLYETHYLENE GLYCOL 3350 17 G: 17 POWDER, FOR SOLUTION ORAL at 17:43

## 2025-06-02 RX ADMIN — ASPIRIN 81 MG CHEWABLE TABLET 81 MG: 81 TABLET CHEWABLE at 17:41

## 2025-06-02 RX ADMIN — METFORMIN HYDROCHLORIDE 500 MG: 500 TABLET ORAL at 17:41

## 2025-06-02 RX ADMIN — HYDROMORPHONE HYDROCHLORIDE 0.5 MG: 1 INJECTION, SOLUTION INTRAMUSCULAR; INTRAVENOUS; SUBCUTANEOUS at 14:36

## 2025-06-02 RX ADMIN — HYDROMORPHONE HYDROCHLORIDE 0.5 MG: 1 INJECTION, SOLUTION INTRAMUSCULAR; INTRAVENOUS; SUBCUTANEOUS at 14:58

## 2025-06-02 RX ADMIN — OXYCODONE HYDROCHLORIDE 10 MG: 5 TABLET ORAL at 23:38

## 2025-06-02 RX ADMIN — ACETAMINOPHEN 650 MG: 325 TABLET ORAL at 17:39

## 2025-06-02 RX ADMIN — HYDROMORPHONE HYDROCHLORIDE 0.5 MG: 1 INJECTION, SOLUTION INTRAMUSCULAR; INTRAVENOUS; SUBCUTANEOUS at 15:18

## 2025-06-02 RX ADMIN — FENTANYL CITRATE 100 MCG: 50 INJECTION, SOLUTION INTRAMUSCULAR; INTRAVENOUS at 09:45

## 2025-06-02 RX ADMIN — PHENYLEPHRINE HYDROCHLORIDE 150 MCG: 10 INJECTION INTRAVENOUS at 10:38

## 2025-06-02 RX ADMIN — LIDOCAINE HYDROCHLORIDE 3 ML: 20 INJECTION, SOLUTION EPIDURAL; INFILTRATION; INTRACAUDAL at 10:11

## 2025-06-02 RX ADMIN — OXYCODONE HYDROCHLORIDE 10 MG: 5 TABLET ORAL at 18:05

## 2025-06-02 RX ADMIN — PROPOFOL 30 MG: 10 INJECTION, EMULSION INTRAVENOUS at 10:11

## 2025-06-02 RX ADMIN — BUPIVACAINE HYDROCHLORIDE IN DEXTROSE 1.8 ML: 7.5 INJECTION, SOLUTION SUBARACHNOID at 09:52

## 2025-06-02 RX ADMIN — CEFAZOLIN 2 G: 2 INJECTION, POWDER, FOR SOLUTION INTRAMUSCULAR; INTRAVENOUS at 17:41

## 2025-06-02 RX ADMIN — FENTANYL CITRATE 50 MCG: 50 INJECTION INTRAMUSCULAR; INTRAVENOUS at 13:55

## 2025-06-02 RX ADMIN — INSULIN GLARGINE 18 UNITS: 100 INJECTION, SOLUTION SUBCUTANEOUS at 21:30

## 2025-06-02 RX ADMIN — WATER 1500 MG: 1000 INJECTION, SOLUTION INTRAVENOUS at 09:22

## 2025-06-02 RX ADMIN — ACETAMINOPHEN 650 MG: 325 TABLET ORAL at 23:38

## 2025-06-02 RX ADMIN — MORPHINE SULFATE 2 MG: 2 INJECTION, SOLUTION INTRAMUSCULAR; INTRAVENOUS at 20:05

## 2025-06-02 RX ADMIN — PHENYLEPHRINE HYDROCHLORIDE 150 MCG: 10 INJECTION INTRAVENOUS at 10:32

## 2025-06-02 RX ADMIN — DULOXETINE 60 MG: 60 CAPSULE, DELAYED RELEASE ORAL at 21:16

## 2025-06-02 RX ADMIN — PHENYLEPHRINE HYDROCHLORIDE 100 MCG: 10 INJECTION INTRAVENOUS at 09:58

## 2025-06-02 RX ADMIN — PHENYLEPHRINE HYDROCHLORIDE 100 MCG: 10 INJECTION INTRAVENOUS at 09:55

## 2025-06-02 RX ADMIN — CEFAZOLIN 2 G: 2 INJECTION, POWDER, FOR SOLUTION INTRAMUSCULAR; INTRAVENOUS at 10:05

## 2025-06-02 RX ADMIN — PHENYLEPHRINE HYDROCHLORIDE 100 MCG: 10 INJECTION INTRAVENOUS at 10:50

## 2025-06-02 RX ADMIN — PHENYLEPHRINE HYDROCHLORIDE 150 MCG: 10 INJECTION INTRAVENOUS at 11:21

## 2025-06-02 RX ADMIN — MIDAZOLAM HYDROCHLORIDE 2 MG: 1 INJECTION, SOLUTION INTRAMUSCULAR; INTRAVENOUS at 09:45

## 2025-06-02 RX ADMIN — SODIUM CHLORIDE, POTASSIUM CHLORIDE, SODIUM LACTATE AND CALCIUM CHLORIDE: 600; 310; 30; 20 INJECTION, SOLUTION INTRAVENOUS at 09:22

## 2025-06-02 RX ADMIN — PROPOFOL 100 MCG/KG/MIN: 10 INJECTION, EMULSION INTRAVENOUS at 10:12

## 2025-06-02 RX ADMIN — SENNOSIDES, DOCUSATE SODIUM 1 TABLET: 50; 8.6 TABLET, FILM COATED ORAL at 20:07

## 2025-06-02 RX ADMIN — PHENYLEPHRINE HYDROCHLORIDE 100 MCG: 10 INJECTION INTRAVENOUS at 11:32

## 2025-06-02 ASSESSMENT — COGNITIVE AND FUNCTIONAL STATUS - GENERAL
STANDING UP FROM CHAIR USING ARMS: 2 - A LOT
WALKING IN HOSPITAL ROOM: 2 - A LOT
CLIMB 3 TO 5 STEPS WITH RAILING: 2 - A LOT
MOVING TO AND FROM BED TO CHAIR: 2 - A LOT

## 2025-06-02 NOTE — ANESTHESIOLOGIST PRE-PROCEDURE ATTESTATION
Pre-Procedure Patient Identification:  I am the Primary Anesthesiologist and have identified the patient on 06/02/25 at 9:14 AM.   I have confirmed the procedure(s) will be performed by the following surgeon/proceduralist Blaise Huber MD.

## 2025-06-02 NOTE — PROGRESS NOTES
Patient: Abad Hinkle  Location: [place] [address]  MRN:  [id number]  Today's date:  6/2/2025    Attempted to see patient for therapy. Unable due to medical hold (PT evaluation held due to pain, discussed with RN).

## 2025-06-02 NOTE — PLAN OF CARE
Plan of Care Review  Plan of Care Reviewed With: patient  Progress: improving  Outcome Evaluation: AAOx4, Spouse at bedside. Post op VS/NV in progress. SCDs in place. Incentive spiromter in reach. Call bell in reach. Bed alarm set.

## 2025-06-02 NOTE — ASSESSMENT & PLAN NOTE
- Moderate AS on Echo Jan 2024. Known to [facility]    - Continue routine surveillance  - Continue BP management

## 2025-06-02 NOTE — ANESTHESIA PROCEDURE NOTES
Spinal Block    Patient location during procedure: holding area  Start time: 6/2/2025 9:45 AM  End time: 6/2/2025 9:53 AM  Staffing  Performed: anesthesiologist   Anesthesiologist: Bernardo Melgar MD  Performed by: Bernardo Melgar MD  Authorized by: Bernardo Melgar MD    Reason for block: at surgeon's request  Preanesthetic Checklist  Completed: patient identified, surgical consent, pre-op evaluation, timeout performed, IV checked, risks and benefits discussed, monitors and equipment checked and sterile field maintained during procedure  Spinal Block  Patient position: sitting  Prep: ChloraPrep and site prepped and draped  Patient monitoring: heart rate, cardiac monitor, continuous pulse ox and blood pressure  Approach: midline  Location: L3-4  Injection technique: single-shot  Needle  Needle type: Katiuskacke   Needle gauge: 22 G  Needle length: 3.5 in  Assessment  Events: cerebrospinal fluid  Additional Notes  Procedure well tolerated. Vital signs stable.    Medications Administered -   bupivacaine PF (MARCAINE SPINAL) 0.75% intrathecal injection - intrathecal   1.8 mL - 6/2/2025 9:52:00 AM

## 2025-06-02 NOTE — PROGRESS NOTES
S  Pt seen & examined in PACU  No complaints, pain well controlled  Denies Cp, SOB, palpitations, nausea & vomiting    O  Alert, VSS, NAD  LLE dressing c/d/i with ice pack in place  BLE DP/PT intact, unable to obtain motor/sensory exam secondary to spinal anesthesia  Calves soft, nontender  BCR  Compartments are soft and compressible    A  73 y/o male for Left TKA with Dr. Huber on 6/2/2025    P  Pain control  Neuro checks  Post op xray in PACU  Postop abx  DVT ppx- ASA 81 BID  PT/OT  WBAT  Med management: [facility]  Decadron 10mg IV x1 POD 1

## 2025-06-02 NOTE — ASSESSMENT & PLAN NOTE
- S/P left total knee arthroplasty on 6/2/25    - DVT prophylaxis and pain management per ortho service  - Pulmonary toilet  - Ambulate  - Hold all natural supplements. Resume post op when ok to do so per ortho

## 2025-06-02 NOTE — CONSULTS
Inpatient Cardiology   Consult Note       Overview:  - S/P elective left knee arthroplasty on 6/2/25. [facility] consulted for post op medical management      Assessment/Plan   Assessment & Plan  Aortic stenosis, moderate  - Moderate AS on Echo Jan 2024. Known to [facility]    - Continue routine surveillance  - Continue BP management   BPH with urinary obstruction  - Continue Terazosin. Ordered with holds as it can effect BP   - Monitor I&O; PVR/bladder scan if needed  Stage 3a chronic kidney disease (CMS/[facility])  - Follow renal function post op  - Avoid nephrotoxins  Obstructive sleep apnea  - Does not use CPAP    - EtC02 monitoring per protocol   - Monitor for signs or symptoms of hypercapnea   - Aggressive pulmonary toilet  - GERD/aspiration precautions    Mixed hyperlipidemia  - Resume Zetia when ok with ortho   Essential hypertension  - Resume Olmesartan and Amlodipine post op when BP requires   - Terazosin resumed for BPH  Type 2 diabetes mellitus with chronic kidney disease, with long-term current use of insulin (CMS/[facility])  - Continue Jardiance and Metformin BID  - Lantus 18 units qHS  - Accuchecks and SSIC PRN  - Resume Mounjaro on d/c  Primary osteoarthritis of left knee  - S/P left total knee arthroplasty on 6/2/25    - DVT prophylaxis and pain management per ortho service  - Pulmonary toilet  - Ambulate  - Hold all natural supplements. Resume post op when ok to do so per ortho           Subjective  Left knee pain. Requesting pain meds. No other complaints.    HPI 73 y/o with progressively worsening left knee pain refractory to conservative measures now requiring surgical intervention. [facility] clearance reviewed in scanned chart.    Review of Systems   Musculoskeletal:  Positive for joint pain.      Histories:    Medical History:   Past Medical History:   Diagnosis Date    Back pain     BPH (benign prostatic hyperplasia)     Chronic kidney disease, stage 3a (CMS/HCC)     Fall 04/2025    pt has  fallen twice in the last month    Frequent urination     Hypertension     Lumbar herniated disc     Mitral valve stenosis and aortic valve stenosis     Neck pain     Pneumonia 2020    RSV infection 2023    Type 2 diabetes mellitus (CMS/HCC)     Vertigo 2023       Surgical History:   Past Surgical History   Procedure Laterality Date    Back surgery  2003    Colonoscopy      Other surgical history Right 2008    fx tib/fib had plate placed. ( [place]       Social History:    Social History     Tobacco Use   Smoking Status Never   Smokeless Tobacco Never     Social History     Social History Narrative    Not on file       Family History:   No family history on file.     Allergies:   Beta-blockers (beta-adrenergic blocking agts), House dust mite, Adhesive tape-silicones, and Latex   Current Medications:    Scheduled:   phenylephrine HCl in 0.9% NaCl           Infusions:   lactated ringer's   125 mL/hr at 06/02/25 0955    lactated ringer's           PRN:    fentaNYL    HYDROmorphone    ondansetron    phenylephrine HCl in 0.9% NaCl    Home medications:  Medications Prior to Admission   Medication Sig    amlodipine-olmesartan (THANG) 10-40 mg per tablet Take 1 tablet by mouth every morning.    DULoxetine (CYMBALTA) 60 mg capsule Take 60 mg by mouth nightly.    ezetimibe (ZETIA) 10 mg tablet Take 10 mg by mouth daily.    insulin glargine U-100 (LANTUS/BASAGLAR) 100 unit/mL (3 mL) pen Inject 18 Units under the skin nightly.    LASIX 20 mg tablet Take 20 mg by mouth every morning. For 3 days    metFORMIN (GLUCOPHAGE) 500 mg tablet Take 500 mg by mouth 2 (two) times a day with breakfast and dinner.    terazosin (HYTRIN) 2 mg capsule Take 6 mg by mouth nightly.    VYZULTA 0.024 % drops ophthalmic drops Administer 1 drop into both eyes nightly.    empagliflozin (JARDIANCE) 25 mg tablet Take 25 mg by mouth every morning.        Objective   Vital signs in last 24 hours:  Temp:  [36.3 °C (97.3 °F)-36.4 °C (97.6 °F)]  36.4 °C (97.6 °F)  Heart Rate:  [57-73] 65  Resp:  [12-34] 13  BP: ()/(44-86) 96/53    Wt Readings from Last 7 Encounters:   06/02/25 105 kg (232 lb)   05/14/25 105 kg (232 lb)   05/09/25 107 kg (235 lb)       Physical Exam  Vitals reviewed.   HENT:      Head: Normocephalic.      Mouth/Throat:      Mouth: Mucous membranes are moist.   Eyes:      General: No scleral icterus.  Cardiovascular:      Rate and Rhythm: Normal rate and regular rhythm.      Heart sounds: Murmur (III/VI AKBAR at the LSB with radiation to base and apex; preserved S2) heard.   Pulmonary:      Effort: Pulmonary effort is normal. No respiratory distress.      Breath sounds: No wheezing.   Abdominal:      General: There is no distension.      Palpations: Abdomen is soft.      Tenderness: There is no abdominal tenderness.   Musculoskeletal:      Comments: Diminished motor/sensation 2/2 spinal    Skin:     General: Skin is warm and dry.      Comments: Knee aquacel c/d/I    Neurological:      Mental Status: He is alert and oriented to person, place, and time.   Psychiatric:         Mood and Affect: Mood normal.            Labs and Data:     Hematology  Lab Results   Component Value Date    WBC 9.00 05/14/2025    HGB 13.2 (L) 05/14/2025    HCT 36.2 (L) 05/14/2025     05/14/2025    INR 1.0 05/14/2025        Radiology:  I have independently reviewed the pertinent imaging from the last 24 hrs.    X-RAY KNEE LEFT 1 OR 2 VIEWS  Result Date: 6/2/2025  IMPRESSION: Please see comment.       Cardiology:    Telemetry  sinus rhythm    ECG   SR 1st deg AVB               LORENA Hatch  6/2/2025  2:54 PM

## 2025-06-02 NOTE — ASSESSMENT & PLAN NOTE
- Continue Terazosin. Ordered with holds as it can effect BP   - Monitor I&O; PVR/bladder scan if needed

## 2025-06-02 NOTE — OP NOTE
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    Description: I met the patient in preoperative holding area indicated the left knee and I signed the surgical site.  They were brought to the operating room and placed in supine position on the operating room table.  Anesthesia was induced.  Preoperative antibiotics were administered within 1 hour of incision.  The extremity was prepped and draped in usual sterile fashion for surgery and a timeout with all operating room personnel was performed.    An anterior longitudinal incision was performed.  A medial parapatellar arthrotomy was used to access the knee.  There was significant degenerative arthritis with advanced cartilage loss and osteophytes.  The tibia was prepared utilizing the longitudinal access of the tibia as well as the medial aspect of the tibial tubercle for rotational alignment.  The femur was prepared utilizing an intramedullary guide for varus valgus alignment.  The trans-epicondylar axis and Larimer's line were utilized for rotational alignment.  The trial femur was impacted in position.  The trial tibia was impacted in position and trial reductions were performed.  The patella was prepared.   Excellent stability and alignment was achieved as well as normal patella tracking with a no thumbs technique.  The trial implants were removed and the wound was copiously irrigated.  The final implants were impacted into position.     A Betadine soak was performed and the wound was then irrigated and closed in layered fashion with a combination of #1 Vicryl and #2 Quill through the arthrotomy.  #2-0 Stratafix was used subcutaneously in a running fashion.  The wound was clean dry and glue applied to the skin edge.  An adhesive dressing was applied. The drapes were removed and the patient awoke from anesthesia without difficulty and was transferred to the recovery room in stable condition.    I was present for and performed all critical aspects of the surgery.

## 2025-06-02 NOTE — OR SURGEON
Pre-Procedure patient identification:  I am the primary operating surgeon/proceduralist and I have reviewed the applicable pathology reports and radiology studies for this procedure. I have identified the patient and confirmed laterality is left on 06/02/25 at 9:25 AM Blaise Huber MD  Phone Number: [phone] Pre-Procedure patient identification:  I am the primary operating surgeon/proceduralist and I have reviewed the applicable pathology reports and radiology studies for this procedure. I have identified the patient on 06/02/25 at 9:25 AM Blaise Huber MD  Phone Number: [phone]

## 2025-06-02 NOTE — BRIEF OP NOTE
KNEE ARTHROPLASTY TOTAL (L) Procedure Note    Procedure:    KNEE ARTHROPLASTY TOTAL  CPT(R) Code:  61440 - [place] TOTAL KNEE ARTHROPLASTY      Pre-op Diagnosis      * Primary osteoarthritis of left knee [M17.12]       Post-op Diagnosis     * Primary osteoarthritis of left knee [M17.12]    Surgeons and Role:     * Blaise Huber MD - Primary    Anesthesia: Spinal    Staff:   Circulator: Argentina Skaggs RN; Get Greene RN  Scrub Person: Britney Rand; Ruthie Villafana RN  Registered Nurse First Assistant: Ila Renteria RN  Hook Gonsalves: Jairon Renteria    Procedure Details   See op note    PATOS (Infection Present at Time of Surgery):    No    Estimated Blood Loss: 150 mL    Specimens:                No specimens collected during this procedure.      Drains: * No LDAs found *    Implants:   Implant Name Type Inv. Item Serial No.  Lot No. LRB No. Used Action   *T* TIBIAL COMP SZ 6 TRIATHLON TRITANIUM - [id number] Tibial baseplate *T* TIBIAL COMP SZ 6 TRIATHLON TRITANIUM  [employer] [id number] Left 1 Implanted   INSERT TRIATHLON PS X3 TIBIAL ETO - [id number]  INSERT TRIATHLON PS X3 TIBIAL ETO  [employer] [id number] Left 1 Implanted   FEMORAL COMP SZ 6/LEFT POST STAB TRIATHLON - [id number] Femoral knee component FEMORAL COMP SZ 6/LEFT POST STAB TRIATHLON  [employer] [id number] Left 1 Implanted   IMP PATELLA SYMMETRIC SZ S36/10MM TRITANIUM - [id number] Patellar implant IMP PATELLA SYMMETRIC  S36/10MM TRITANIUM  [employer] [id number] Left 1 Implanted              Complications:  None; patient tolerated the procedure well.           Disposition: PACU - hemodynamically stable.           Condition: stable    Blaise Huber MD  Phone Number: [phone]

## 2025-06-02 NOTE — ANESTHESIA POSTPROCEDURE EVALUATION
Patient: Abad Hinkle    Procedure Summary       Date: 06/02/25 Room / Location:  [facility] OR 06 /  OR [facility]    Anesthesia Start: 0955 Anesthesia Stop: 1154    Procedure: KNEE ARTHROPLASTY TOTAL (Left: Knee) Diagnosis:       Primary osteoarthritis of left knee      (Primary osteoarthritis of left knee)    Surgeons: Blaise Huber MD Responsible Provider: Esme Quinn DO    Anesthesia Type: spinal ASA Status: 3            Anesthesia Type: spinal  PACU Vitals  6/2/2025 1146 - 6/2/2025 1246        6/2/2025  1146 6/2/2025  1150 6/2/2025  1200 6/2/2025  1210    BP: -- 86/45 96/54 101/51    Temp: 36.4 °C (97.6 °F) -- -- --    Pulse: -- 71 67 66    Resp: -- 31 16 16    SpO2: -- -- 96 % --                6/2/2025  1215 6/2/2025  1220 6/2/2025  1230 6/2/2025  1240    BP: -- 122/56 75/50 --    Temp: -- -- -- --    Pulse: 68 68 67 59    Resp: 19 24 34 21    SpO2: 96 % -- 96 % --                6/2/2025  1245             BP: --       Temp: --       Pulse: 59       Resp: 18       SpO2: 99 %                 Anesthesia Post Evaluation    Pain management: adequate  Mode of pain management: IV medication  Patient location during evaluation: PACU  Patient participation: complete - patient participated  Level of consciousness: awake and alert  Cardiovascular status: acceptable  Airway Patency: adequate  Respiratory status: acceptable  Hydration status: acceptable  Anesthetic complications: no

## 2025-06-02 NOTE — ASSESSMENT & PLAN NOTE
- Does not use CPAP    - EtC02 monitoring per protocol   - Monitor for signs or symptoms of hypercapnea   - Aggressive pulmonary toilet  - GERD/aspiration precautions

## 2025-06-02 NOTE — ASSESSMENT & PLAN NOTE
- Continue Jardiance and Metformin BID  - Lantus 18 units qHS  - Accuchecks and SSIC PRN  - Resume Mounjaro on d/c

## 2025-06-02 NOTE — ANESTHESIA PREPROCEDURE EVALUATION
Relevant Problems   CARDIOVASCULAR   (+) Aortic stenosis, moderate   (+) Essential hypertension      MUSCULOSKELETAL   (+) Primary osteoarthritis of left knee      RESPIRATORY SYSTEM   (+) Obstructive sleep apnea      URINARY SYSTEM   (+) BPH with urinary obstruction      Other   (+) Type 2 diabetes mellitus with chronic kidney disease, with long-term current use of insulin (CMS/[facility])       Anesthesia ROS/MED HX      Pulmonary    Pneumonia   Sleep apnea  Cardiovascular   Valvular problems/murmurs   Aortic Stenosis: moderate   hypertension  Endo/Other   Diabetes       Past Surgical History   Procedure Laterality Date    Back surgery  2003    Colonoscopy      Other surgical history Right 2008    fx tib/fib had plate placed. ( [place]       Physical Exam    Airway   Mallampati: II   TM distance: >3 FB   Neck ROM: full  Cardiovascular - normal   Rhythm: regular   Rate: normalPulmonary - normal   clear to auscultation  Dental - normal        Anesthesia Plan    Plan: spinal       3 ASA  Anesthetic plan and risks discussed with: patient  Postop Plan:   Patient Disposition: inpatient floor planned admission   Pain Management: IV analgesics

## 2025-06-03 LAB
ANION GAP SERPL CALC-SCNC: 5 MEQ/L (ref 3–15)
BUN SERPL-MCNC: 20 MG/DL (ref 7–25)
CALCIUM SERPL-MCNC: 9.4 MG/DL (ref 8.6–10.3)
CHLORIDE SERPL-SCNC: 101 MEQ/L (ref 98–107)
CO2 SERPL-SCNC: 27 MEQ/L (ref 21–31)
CREAT SERPL-MCNC: 1.2 MG/DL (ref 0.7–1.3)
EGFRCR SERPLBLD CKD-EPI 2021: >60 ML/MIN/1.73M*2
ERYTHROCYTE [DISTWIDTH] IN BLOOD BY AUTOMATED COUNT: 12.9 % (ref 11.6–14.4)
GLUCOSE BLD-MCNC: 122 MG/DL (ref 70–99)
GLUCOSE BLD-MCNC: 135 MG/DL (ref 70–99)
GLUCOSE BLD-MCNC: 139 MG/DL (ref 70–99)
GLUCOSE BLD-MCNC: 147 MG/DL (ref 70–99)
GLUCOSE SERPL-MCNC: 164 MG/DL (ref 70–99)
HCT VFR BLD AUTO: 34.6 % (ref 40.1–51)
HGB BLD-MCNC: 12 G/DL (ref 13.7–17.5)
MCH RBC QN AUTO: 29.6 PG (ref 28–33.2)
MCHC RBC AUTO-ENTMCNC: 34.7 G/DL (ref 32.2–36.5)
MCV RBC AUTO: 85.4 FL (ref 83–98)
PLATELET # BLD AUTO: 200 K/UL (ref 150–350)
PMV BLD AUTO: 9.1 FL (ref 9.4–12.4)
POCT TEST: ABNORMAL
POTASSIUM SERPL-SCNC: 4.2 MEQ/L (ref 3.5–5.1)
RBC # BLD AUTO: 4.05 M/UL (ref 4.5–5.8)
SODIUM SERPL-SCNC: 133 MEQ/L (ref 136–145)
WBC # BLD AUTO: 9.58 K/UL (ref 3.8–10.5)

## 2025-06-03 PROCEDURE — 97116 GAIT TRAINING THERAPY: CPT | Mod: GP

## 2025-06-03 PROCEDURE — 63700000 HC SELF-ADMINISTRABLE DRUG: Performed by: NURSE PRACTITIONER

## 2025-06-03 PROCEDURE — 99232 SBSQ HOSP IP/OBS MODERATE 35: CPT | Mod: FS | Performed by: INTERNAL MEDICINE

## 2025-06-03 PROCEDURE — 97162 PT EVAL MOD COMPLEX 30 MIN: CPT | Mod: GP

## 2025-06-03 PROCEDURE — 80048 BASIC METABOLIC PNL TOTAL CA: CPT | Performed by: NURSE PRACTITIONER

## 2025-06-03 PROCEDURE — 63700000 HC SELF-ADMINISTRABLE DRUG: Performed by: PHYSICIAN ASSISTANT

## 2025-06-03 PROCEDURE — 85027 COMPLETE CBC AUTOMATED: CPT | Performed by: NURSE PRACTITIONER

## 2025-06-03 PROCEDURE — 97166 OT EVAL MOD COMPLEX 45 MIN: CPT | Mod: GO

## 2025-06-03 PROCEDURE — 63600000 HC DRUGS/DETAIL CODE: Mod: JZ | Performed by: NURSE PRACTITIONER

## 2025-06-03 PROCEDURE — 25800000 HC PHARMACY IV SOLUTIONS: Performed by: NURSE PRACTITIONER

## 2025-06-03 PROCEDURE — 97530 THERAPEUTIC ACTIVITIES: CPT | Mod: GO

## 2025-06-03 PROCEDURE — 36415 COLL VENOUS BLD VENIPUNCTURE: CPT | Performed by: NURSE PRACTITIONER

## 2025-06-03 RX ORDER — OXYCODONE HYDROCHLORIDE 5 MG/1
2.5 TABLET ORAL EVERY 4 HOURS PRN
Refills: 0 | Status: DISCONTINUED | OUTPATIENT
Start: 2025-06-03 | End: 2025-06-04

## 2025-06-03 RX ORDER — OXYCODONE HYDROCHLORIDE 5 MG/1
5 TABLET ORAL EVERY 4 HOURS PRN
Qty: 30 TABLET | Refills: 0 | Status: CANCELLED | OUTPATIENT
Start: 2025-06-03 | End: 2025-06-08

## 2025-06-03 RX ORDER — AMLODIPINE BESYLATE 5 MG/1
5 TABLET ORAL 2 TIMES DAILY
Status: DISCONTINUED | OUTPATIENT
Start: 2025-06-03 | End: 2025-06-04

## 2025-06-03 RX ORDER — ACETAMINOPHEN 325 MG/1
650 TABLET ORAL EVERY 6 HOURS PRN
Qty: 30 TABLET | Refills: 0 | Status: CANCELLED | OUTPATIENT
Start: 2025-06-03 | End: 2025-07-03

## 2025-06-03 RX ORDER — OXYCODONE HYDROCHLORIDE 5 MG/1
5 TABLET ORAL EVERY 4 HOURS PRN
Refills: 0 | Status: DISCONTINUED | OUTPATIENT
Start: 2025-06-03 | End: 2025-06-04

## 2025-06-03 RX ADMIN — AMLODIPINE BESYLATE 5 MG: 5 TABLET ORAL at 21:31

## 2025-06-03 RX ADMIN — CEFAZOLIN 2 G: 2 INJECTION, POWDER, FOR SOLUTION INTRAMUSCULAR; INTRAVENOUS at 02:05

## 2025-06-03 RX ADMIN — ACETAMINOPHEN 650 MG: 325 TABLET ORAL at 11:09

## 2025-06-03 RX ADMIN — ACETAMINOPHEN 650 MG: 325 TABLET ORAL at 05:57

## 2025-06-03 RX ADMIN — LATANOPROST 1 DROP: 50 SOLUTION OPHTHALMIC at 21:33

## 2025-06-03 RX ADMIN — ACETAMINOPHEN 650 MG: 325 TABLET ORAL at 17:03

## 2025-06-03 RX ADMIN — OXYCODONE HYDROCHLORIDE 5 MG: 5 TABLET ORAL at 14:59

## 2025-06-03 RX ADMIN — SENNOSIDES, DOCUSATE SODIUM 1 TABLET: 50; 8.6 TABLET, FILM COATED ORAL at 21:35

## 2025-06-03 RX ADMIN — TERAZOSIN HYDROCHLORIDE 6 MG: 1 CAPSULE ORAL at 21:16

## 2025-06-03 RX ADMIN — SENNOSIDES, DOCUSATE SODIUM 1 TABLET: 50; 8.6 TABLET, FILM COATED ORAL at 08:54

## 2025-06-03 RX ADMIN — METFORMIN HYDROCHLORIDE 500 MG: 500 TABLET ORAL at 08:54

## 2025-06-03 RX ADMIN — ASPIRIN 81 MG CHEWABLE TABLET 81 MG: 81 TABLET CHEWABLE at 21:31

## 2025-06-03 RX ADMIN — OXYCODONE HYDROCHLORIDE 10 MG: 5 TABLET ORAL at 08:59

## 2025-06-03 RX ADMIN — OXYCODONE HYDROCHLORIDE 10 MG: 5 TABLET ORAL at 03:31

## 2025-06-03 RX ADMIN — METFORMIN HYDROCHLORIDE 500 MG: 500 TABLET ORAL at 17:03

## 2025-06-03 RX ADMIN — ACETAMINOPHEN 650 MG: 325 TABLET ORAL at 23:31

## 2025-06-03 RX ADMIN — INSULIN GLARGINE 18 UNITS: 100 INJECTION, SOLUTION SUBCUTANEOUS at 22:14

## 2025-06-03 RX ADMIN — TIZANIDINE 2 MG: 2 TABLET ORAL at 05:58

## 2025-06-03 RX ADMIN — SODIUM CHLORIDE: 9 INJECTION, SOLUTION INTRAVENOUS at 02:06

## 2025-06-03 RX ADMIN — PANTOPRAZOLE SODIUM 40 MG: 40 TABLET, DELAYED RELEASE ORAL at 05:58

## 2025-06-03 RX ADMIN — POLYETHYLENE GLYCOL 3350 17 G: 17 POWDER, FOR SOLUTION ORAL at 08:54

## 2025-06-03 RX ADMIN — DULOXETINE 60 MG: 60 CAPSULE, DELAYED RELEASE ORAL at 21:31

## 2025-06-03 RX ADMIN — EMPAGLIFLOZIN 25 MG: 25 TABLET, FILM COATED ORAL at 08:54

## 2025-06-03 RX ADMIN — ASPIRIN 81 MG CHEWABLE TABLET 81 MG: 81 TABLET CHEWABLE at 08:54

## 2025-06-03 ASSESSMENT — COGNITIVE AND FUNCTIONAL STATUS - GENERAL
HELP NEEDED FOR PERSONAL GROOMING: 3 - A LITTLE
WALKING IN HOSPITAL ROOM: 2 - A LOT
TOILETING: 2 - A LOT
MOVING TO AND FROM BED TO CHAIR: 2 - A LOT
DRESSING REGULAR UPPER BODY CLOTHING: 3 - A LITTLE
AFFECT: WFL
MOVING TO AND FROM BED TO CHAIR: 2 - A LOT
WALKING IN HOSPITAL ROOM: 2 - A LOT
STANDING UP FROM CHAIR USING ARMS: 2 - A LOT
HELP NEEDED FOR BATHING: 2 - A LOT
EATING MEALS: 4 - NONE
AFFECT: WFL
CLIMB 3 TO 5 STEPS WITH RAILING: 2 - A LOT
CLIMB 3 TO 5 STEPS WITH RAILING: 2 - A LOT
DRESSING REGULAR LOWER BODY CLOTHING: 2 - A LOT
STANDING UP FROM CHAIR USING ARMS: 2 - A LOT

## 2025-06-03 NOTE — PLAN OF CARE
Problem: Adult Inpatient Plan of Care  Goal: Plan of Care Review  Outcome: Progressing  Flowsheets (Taken 6/3/2025 1202)  Progress: improving  Outcome Evaluation: PT eval complete  Plan of Care Reviewed With: patient     Problem: Knee Arthroplasty  Goal: Optimal Functional Ability  Outcome: Progressing

## 2025-06-03 NOTE — PROGRESS NOTES
Physical Therapy -  Initial Evaluation     Patient: Abad Hinkle  Location: [place] [address]  MRN: [id number]  Today's date: 6/3/2025    HISTORY OF PRESENT ILLNESS     Abad is a 74 y.o. male admitted on 6/2/2025 with Primary osteoarthritis of left knee [M17.12]  Knee pain [M25.569]. Principal problem is No Principal Problem: There is no principal problem currently on the Problem List. Please update the Problem List and refresh..    Past Medical History  Abad has a past medical history of Back pain, BPH (benign prostatic hyperplasia), Chronic kidney disease, stage 3a (CMS/[facility]), Fall (04/2025), Frequent urination, Hypertension, Lumbar herniated disc, Mitral valve stenosis and aortic valve stenosis, Neck pain, Pneumonia (2020), RSV infection (2023), Type 2 diabetes mellitus (CMS/[facility]), and Vertigo (2023).    History of Present Illness   S/p L TKA WBAT    PRIOR LEVEL OF FUNCTION AND LIVING ENVIRONMENT     Prior Level of Function      Flowsheet Row Most Recent Value   Dominant Hand right   Ambulation independent   Transferring independent   Toileting independent   Bathing independent   Dressing independent   Eating independent   IADLs independent   Driving/Transportation    Communication understands/communicates without difficulty   Prior Level of Function Comment Pt has quad cane but was not using it prior to surgery, otherwise independent in BADL tasks. retired  and drive   Assistive Device Currently Used at Home grab bar, cane, quad, shower chair        Prior Living Environment      Flowsheet Row Most Recent Value   Living Environment Comment 2SH with 2STE through garage. plans to sleep in recliner chair on first floor with powder room.  FF to second floor tub/shower and WIS +SC and standard toilet with safety frame       VITALS AND PAIN     PT Vitals      Date/Time Pulse HR Source SpO2 BP BP Location BP Method Pt Position [facility]   06/03/25 1033 -- -- -- 124/56  Left upper arm Automatic Sitting [facility]   06/03/25 1033 78 Monitor 98 % -- -- -- --    06/03/25 1056 90 Monitor 98 % 146/58 Right upper arm Automatic Sitting           PT Pain      Date/Time Pain Type Side/Orientation Location Rating: Rest Rating: Activity [facility]   06/03/25 1030 Pain Assessment left knee 3 7 WD   06/03/25 1033 Pain Assessment left knee 3 7 AGR               Objective     OBJECTIVE     Start time:  1030  End time:  1056  Session Length: 26 min  Mode of Treatment: co-treatment  Reason for co-treatment: To faciliate pt d/c    General Observations  Patient received reclined, in therapy gym, in chair. He was agreeable to therapy, no issues or concerns identified by nurse prior to session.      Precautions: fall, weight bearing  Extremity Weight-Bearing Status: left lower extremity  Left LE Weight-Bearing Status: weight-bearing as tolerated (WBAT)     Services  Do You Speak a Language Other Than English at Home?: no  Is an  Needed/Used?: No      PT Eval and Treat - 06/03/25 1030          Cognition    Orientation Status oriented x 3     Affect/Mental Status WFL     Follows Commands WFL     Cognitive Function WFL        Vision Assessment/Intervention    Vision Assessment corrective lenses full-time        Hearing Assessment    Hearing Status hearing aid, bilateral        Sensory Assessment    Sensory Assessment sensation intact except     Sensation Impaired Location(s) right UE;left LE     Sensory Subjective Reports tingling        Lower Extremity Assessment    General Observations Pt strength and ROM impaired due to pain. Strength appears to be at least a 3-/5 due to ability to ambulate short distances with RW. Not formally assessed.        Mobility Belt    Mobility Belt Used During Session yes        Sit/Stand Transfer    Surface chair with arm rests     [place] moderate assist (50-74% patient effort);1 person assist     Safety/Cues increased time to complete     Assistive Device  walker, front-wheeled        Gait Training    [place], Gait minimum assist (75% or more patient effort);2 person assist     Safety/Cues increased time to complete;verbal cues;proper use of assistive device;maintaining center of gravity over base of support;moderate     Assistive Device walker, front-wheeled     Distance in Feet 24 feet     Pattern step-to     Deviations/Abnormal Patterns antalgic;gait speed decreased;step length decreased     Comment Pt needed mod verbal cues for turns with RW.        Balance    Static Sitting Balance WFL     Dynamic Sitting Balance mild impairment     Sit to Stand Dynamic Balance moderate impairment     Static Standing Balance mild impairment     Dynamic Standing Balance moderate impairment        Impairments/Safety Issues    Impairments Affecting Function balance;endurance/activity tolerance;range of motion (ROM);strength;pain                                              Education Documentation  Treatment Plan, taught by Deni Stahl at 6/3/2025 12:03 PM.  Learner: Patient  Readiness: Acceptance  Method: Explanation  Response: Verbalizes Understanding  Comment: Pt was educated on his POC, currently level of function as well as assistance level, transfers, and AD usage.        Session Outcome  Patient in chair, reclined, in therapy gym at end of session, all needs met. Nursing notified about patient's performance, patient's position, and patient's response to therapy/activity.    AM-PAC™ - Mobility (Current Function)     Turning form your back to your side while in flat bed without using bedrails 3 - A Little   Moving from lying on your back to sitting on the side of a flat bed without using bedrails 3 - A Little   Moving to and from a bed to a chair 2 - A Lot   Standing up from a chair using your arms 2 - A Lot   To walk in a hospital room 2 - A Lot   Climbing 3-5 steps with a railing 2 - A Lot   AM-PAC™ Mobility Score 14          ASSESSMENT AND PLAN     Progress  Summary  Pt was seen by therapy today. Pt reports that he was previously independent with all ADLs. At [facility], pt presents needing mod A x1 for STS transfers and min A x2 and RW for safe ambulation over flat firm ground. Pt would benefit from therapy in order to address deficits with ambulation, transfers, and deconditioning in order to improve QoL at home and help return to independence.    Patient/Family Therapy Goals Statement: Pt wants to be independent again and return to prior level of function.    PT Plan      Flowsheet Row Most Recent Value   Rehab Potential good, to achieve stated therapy goals at 06/03/2025 1030   Therapy Frequency daily at 06/03/2025 1030   Planned Therapy Interventions balance training, home exercise program, bed mobility training, gait training, stretching, strengthening, ROM (range of motion), stair training, patient/family education, transfer training at 06/03/2025 1030       PT Discharge Recommendations      Flowsheet Row Most Recent Value   PT Recommended Discharge Disposition skilled nursing facility at 06/03/2025 1030   Anticipated Equipment Needs if Discharged Home (PT) walker, front-wheeled at 06/03/2025 1030            PT Goals      Flowsheet Row Most Recent Value   Bed Mobility Goal 1    Activity/Assistive Device bed mobility activities, all at 06/03/2025 1030   [place] modified independence at 06/03/2025 1030   Time Frame by discharge at 06/03/2025 1030   Progress/Outcome goal ongoing at 06/03/2025 1030   Transfer Goal 1    Activity/Assistive Device sit-to-stand/stand-to-sit, car transfer at 06/03/2025 1030   [place] modified independence at 06/03/2025 1030   Time Frame by discharge at 06/03/2025 1030   Progress/Outcome goal ongoing at 06/03/2025 1030   Gait Training Goal 1    Activity/Assistive Device gait (walking locomotion), walker, front-wheeled at 06/03/2025 1030   [place] modified independence at 06/03/2025 1030   Time Frame by discharge at 06/03/2025 1030    Progress/Outcome goal ongoing at 06/03/2025 1030   Stairs Goal 1    Activity/Assistive Device descending stairs, ascending stairs at 06/03/2025 1030   [place] modified independence at 06/03/2025 1030   Number of Stairs 4 at 06/03/2025 1030   Time Frame by discharge at 06/03/2025 1030   Progress/Outcome goal ongoing at 06/03/2025 1030

## 2025-06-03 NOTE — PROGRESS NOTES
Inpatient Cardiology   Daily Progress Note       Overview:     - S/P left total knee arthroplasty on 6/2/25.        Assessment/Plan   Assessment & Plan  Aortic stenosis, moderate  - Moderate AS on Echo Jan 2024. Known to [facility]    - Continue routine surveillance  - Continue BP management   BPH with urinary obstruction  - Continue Terazosin.   - Monitor I&O; PVR/bladder scan if needed  Stage 3a chronic kidney disease (CMS/[facility])  - Follow renal function post op  - Avoid nephrotoxins  Obstructive sleep apnea  - Does not use CPAP    - EtC02 monitoring per protocol   - Monitor for signs or symptoms of hypercapnea   - Aggressive pulmonary toilet  - GERD/aspiration precautions    Mixed hyperlipidemia  - Resume Zetia when ok with ortho   Essential hypertension  - Amlodipine 6/3 - 5mg BID (usual dose 10mg daily)  - Continue Terazosin  - Resume ARB when BP requires   Type 2 diabetes mellitus with chronic kidney disease, with long-term current use of insulin (CMS/[facility])  - Continue Jardiance and Metformin BID  - Lantus 18 units qHS  - Accuchecks and SSIC PRN  - Resume Mounjaro on d/c  Primary osteoarthritis of left knee  - S/P left total knee arthroplasty on 6/2/25    - Pain management per ortho - adjusted on 6/3 due to confusion after med administration. Avoid over-sedation.   - DVT prophylaxis  - Pulmonary toilet  - Ambulate  - Hold all natural supplements. Resume post op when ok to do so per ortho           Subjective   Seen and examined. Feels a bit confused, forgetful, and foggy but is oriented to self and place. Eating breakfast. Knee pain controlled. D/W nursing and ortho.       Current Medications:    Scheduled:   acetaminophen  650 mg oral q6h INT    aspirin  81 mg oral BID    DULoxetine  60 mg oral Nightly    empagliflozin  25 mg oral q AM    insulin glargine U-100  18 Units subcutaneous Nightly    insulin lispro U-100  6-10 Units subcutaneous With meals & nightly    latanoprost  1 drop Both Eyes Nightly     metFORMIN  500 mg oral BID with breakfast and dinner    pantoprazole  40 mg oral Daily before breakfast    polyethylene glycol  17 g oral Daily    sennosides-docusate sodium  1 tablet oral BID    terazosin  6 mg oral Nightly       Infusions:   sodium chloride 0.9 %   125 mL/hr at 06/03/25 0206       PRN:    alum-mag hydroxide-simeth    glucose **OR** dextrose **OR** glucagon **OR** dextrose 50 % in water (D50)    diphenhydrAMINE **OR** diphenhydrAMINE    morphine    ondansetron ODT **OR** ondansetron    oxyCODONE **OR** oxyCODONE    tiZANidine     Objective   Vital signs in last 24 hours:  Temp:  [36.2 °C (97.2 °F)-36.9 °C (98.4 °F)] 36.6 °C (97.9 °F)  Heart Rate:  [57-94] 94  Resp:  [12-34] 18  BP: ()/(44-77) 143/77    Weights (last 5 days)       Date/Time Weight    06/02/25 1700 106 kg (234 lb 9.1 oz)    06/02/25 0834 105 kg (232 lb)            Intake/Output Summary (Last 24 hours) at 6/3/2025 0659  Last data filed at 6/2/2025 2130  24 Hour Net Input/Output from 7AM Yesterday   Intake 1600 ml   Output 1350 ml   Net 250 ml     Net IO Since Admission: 250 mL [06/03/25 1018]    Physical Exam  Vitals and nursing note reviewed.   HENT:      Head: Normocephalic.      Mouth/Throat:      Mouth: Mucous membranes are moist.   Eyes:      General: No scleral icterus.  Cardiovascular:      Rate and Rhythm: Normal rate and regular rhythm.      Heart sounds: Murmur (II/VI AKBAR at LSB with radiation to apex) heard.   Pulmonary:      Effort: Pulmonary effort is normal. No respiratory distress.      Breath sounds: No wheezing.   Abdominal:      General: There is no distension.      Palpations: Abdomen is soft.      Tenderness: There is no abdominal tenderness.   Musculoskeletal:      Right lower leg: No edema.      Left lower leg: No edema.   Skin:     General: Skin is warm and dry.   Neurological:      Mental Status: He is alert.      Comments: Oriented to self and place   Psychiatric:         Mood and Affect: Mood normal.             Labs and Data:      Hematology    Results from last 7 days   Lab Units 06/03/25  0400   WBC K/uL 9.58   HEMOGLOBIN g/dL 12.0*   HEMATOCRIT % 34.6*   PLATELETS K/uL 200       Chemistries    Results from last 7 days   Lab Units 06/03/25  0400   SODIUM mEQ/L 133*   POTASSIUM mEQ/L 4.2   CHLORIDE mEQ/L 101   CREATININE mg/dL 1.2   BUN mg/dL 20   CO2 mEQ/L 27   GLUCOSE mg/dL 164*   CALCIUM mg/dL 9.4        Radiology:  I have independently reviewed the pertinent imaging from the last 24 hrs.    X-RAY KNEE LEFT 1 OR 2 VIEWS  Result Date: 6/2/2025  IMPRESSION: Please see comment.                         LORENA Hatch  6/3/2025

## 2025-06-03 NOTE — ASSESSMENT & PLAN NOTE
- Amlodipine 6/3 - 5mg BID (usual dose 10mg daily)  - Continue Terazosin  - Resume ARB when BP requires

## 2025-06-03 NOTE — PROGRESS NOTES
Occupational Therapy -  Initial Evaluation     Patient: Abad Hinkle  Location: [place] [address]  MRN: [id number]  Today's date: 6/3/2025    HISTORY OF PRESENT ILLNESS     Abad is a 74 y.o. male admitted on 6/2/2025 with Primary osteoarthritis of left knee [M17.12]  Knee pain [M25.569]. Principal problem is No Principal Problem: There is no principal problem currently on the Problem List. Please update the Problem List and refresh..    Past Medical History  Abad has a past medical history of Back pain, BPH (benign prostatic hyperplasia), Chronic kidney disease, stage 3a (CMS/[facility]), Fall (04/2025), Frequent urination, Hypertension, Lumbar herniated disc, Mitral valve stenosis and aortic valve stenosis, Neck pain, Pneumonia (2020), RSV infection (2023), Type 2 diabetes mellitus (CMS/[facility]), and Vertigo (2023).    History of Present Illness   S/p L TKA WBAT    PRIOR LEVEL OF FUNCTION AND LIVING ENVIRONMENT     Prior Level of Function      Flowsheet Row Most Recent Value   Dominant Hand right   Ambulation independent   Transferring independent   Toileting independent   Bathing independent   Dressing independent   Eating independent   IADLs independent   Driving/Transportation    Communication understands/communicates without difficulty   Prior Level of Function Comment Pt has quad cane but was not using it prior to surgery, otherwise independent in BADL tasks. retired  and drive   Assistive Device Currently Used at Home grab bar, cane, quad, shower chair   History of Falls: Two or more falls in the past year    Prior Living Environment      Flowsheet Row Most Recent Value   Living Environment Comment 2SH with 2STE through garage. plans to sleep in recliner chair on first floor with powder room.  FF to second floor tub/shower and WIS +SC and standard toilet with safety frame     Occupational Profile      Flowsheet Row Most Recent Value   Successful Occupations  retired   [employer] drives   Environmental Supports and Barriers lives with spouse, son, and one of his daughters       VITALS AND PAIN     OT Vitals      Date/Time Pulse HR Source SpO2 BP BP Location BP Method Pt Position [facility]   06/03/25 1033 -- -- -- 124/56 Left upper arm Automatic Sitting AGR   06/03/25 1033 78 Monitor 98 % -- -- -- -- WD   06/03/25 1056 90 Monitor 98 % 146/58 Right upper arm Automatic Sitting WD          OT Pain      Date/Time Pain Type Side/Orientation Location Rating: Rest Rating: Activity [facility]   06/03/25 1033 Pain Assessment knee left 3 7 AGR               Objective     OBJECTIVE     Start time:  1031  End time:  1056  Session Length: 25 min  Mode of Treatment: co-treatment  Reason for co-treatment: expedite d/c    General Observations  Patient received reclined, in therapy gym, in chair. He was no issues or concerns identified by nurse prior to session, agreeable to therapy.      Precautions: fall, weight bearing  Extremity Weight-Bearing Status: left lower extremity  Left LE Weight-Bearing Status: weight-bearing as tolerated (WBAT)     Services  Do You Speak a Language Other Than English at Home?: no  Is an  Needed/Used?: No      OT Eval and Treat - 06/03/25 1033          Cognition    Orientation Status oriented x 3     Affect/Mental Status WFL     Follows Commands WFL     Cognitive Function WFL        Vision Assessment/Intervention    Vision Assessment corrective lenses full-time        Hearing Assessment    Hearing Status hearing impairment, bilaterally        Sensory Assessment    Sensory Assessment sensation intact except     Sensation Impaired Location(s) right LE;left LE     Sensory Subjective Reports tingling   endorses tingling in BLE from knees down       Upper Extremity Assessment    UE Assessment ROM and Strength WFL        Bed Mobility    Comment OOB throughout treatment        Mobility Belt    Mobility Belt Used During Session yes        Sit/Stand  Transfer    Surface chair with arm rests     [place] moderate assist (50-74% patient effort);1 person assist     Safety/Cues increased time to complete     Assistive Device walker, front-wheeled     Transfer Comments +posterior LOB with initial stand requiring A for correction, +posterior lean with additional stand. Impaired sitting balance requires max VCs for controlled sitting, hand placement, and stepping back fully prior to sitting with fair (-) carryover        Toilet Transfer    Transfer Technique sit/stand     [place] moderate assist (50-74% patient effort);1 person assist     Safety/Cues increased time to complete;moderate;verbal cues;sequencing;technique     Assistive Device commode, 3-in-1;walker, front-wheeled     Comment benefits from higher height surface and arm rails to decrease exertion with task, still with posterior lean in standing requiring A for correct and assistance to transition hands to RW        Functional Mobility    Distance in therapy gym     Functional Mobility [place] minimum assist (75% or more patient effort);2 person assist;1 person assist     Safety/Cues moderate;verbal cues;hand placement;sequencing;proper use of assistive device;increased time to complete     Assistive Device walker, front-wheeled     Functional Mobility Comments min A initially for linear ambulation with RW however requires min A x2 ppl for tight turn navigation. Tends to push RW away from him requirs VCs to step into RW and utilize BUE for increased stability. Limited tolerance due to pain reporting 9/10 with activity. RN made aware        Lower Body Dressing    Tasks doff;don;socks     [place] maximum assist (25-49% patient effort)     Safety/Cues increased time to complete     Position supported sitting     Adaptive Equipment none     Comment limited reach due to pain would benefit from LH AD moving forward to increase independence        Balance    Static Sitting Balance WFL     Dynamic  Sitting Balance mild impairment     Sit to Stand Dynamic Balance moderate impairment;supported     Static Standing Balance mild impairment;supported     Dynamic Standing Balance moderate impairment;supported     Comment, Balance with RW                     Functional Reach Test  Trial One: Functional Reach Test (in): 0 inches (unable to tolerate)  Trial Two: Functional Reach Test (in): 0 inches  Average (in): 0 inches                        Education Documentation  Home Safety, taught by Yoselyn Westfall OT at 6/3/2025 11:17 AM.  Learner: Patient  Readiness: Acceptance  Method: Explanation, Demonstration  Response: Verbalizes Understanding, Demonstrated Understanding, Needs Reinforcement  Comment: OT POC functional transfers and mobility fall prevention safety for BADL participation        Session Outcome  Patient reclined, in chair, in therapy gym at end of session, returned to room by therapy aide, all needs met. Nursing notified about patient's performance, patient's position, and patient's response to therapy/activity.    AM-PAC™ - ADL (Current Function)     Putting on/taking off regular lower body clothing 2 - A Lot   Bathing 2 - A Lot   Toileting 2 - A Lot   Putting on/taking off regular upper body clothing 3 - A Little   Help for taking care of personal grooming 3 - A Little   Eating meals 4 - None   AM-PAC™ ADL Score 16          ASSESSMENT AND PLAN     Progress Summary  OT eval complete. Presenting with increased pain impacting strength, endurance, and balance during BADL tasks. Reports independence prior to surgery in BADL tasks and ambulation without AD. Now Mod A for functional transfers and min A x1-x2 ppl for ambulation with RW, able to tolerate short household distance with increased time and exertion. Posterior LOB with initial stand requiring A for correction, endorses pain 9/10 with activity limiting tolerance. Endorses x2 falls in past few months, fall risk increasing with current surgery and  pain. Continue OT services to address ADL dysfunction. Rec SNF at d/c    Patient/Family Therapy Goal Statement: to go home    OT Plan      Flowsheet Row Most Recent Value   Rehab Potential good, to achieve stated therapy goals at 06/03/2025 1033   Therapy Frequency 4 times/wk at 06/03/2025 1033   Planned Therapy Interventions adaptive equipment training, activity tolerance training, functional balance retraining, strengthening exercise, transfer/mobility retraining at 06/03/2025 1033       OT Discharge Recommendations      Flowsheet Row Most Recent Value   OT Recommended Discharge Disposition skilled nursing facility at 06/03/2025 1033   Anticipated Equipment Needs if Discharged Home (OT) bathing equipment, dressing equipment, reacher, walker, front-wheeled, commode, 3-in-1 at 06/03/2025 1033            OT Goals      Flowsheet Row Most Recent Value   Bed Mobility Goal 1    Activity/Assistive Device bed mobility activities, all at 06/03/2025 1033   [place] modified independence at 06/03/2025 1033   Time Frame by discharge at 06/03/2025 1033   Progress/Outcome goal ongoing at 06/03/2025 1033   Transfer Goal 1    Activity/Assistive Device toilet at 06/03/2025 1033   [place] modified independence at 06/03/2025 1033   Time Frame by discharge at 06/03/2025 1033   Progress/Outcome goal ongoing at 06/03/2025 1033   Transfer Goal 2    Activity/Assistive Device shower at 06/03/2025 1033   [place] supervision required at 06/03/2025 1033   Time Frame by discharge at 06/03/2025 1033   Progress/Outcome goal ongoing at 06/03/2025 1033   Dressing Goal 1    Activity/Adaptive Equipment dressing skills, all at 06/03/2025 1033   [place] modified independence at 06/03/2025 1033   Time Frame by discharge at 06/03/2025 1033   Progress/Outcome goal ongoing at 06/03/2025 1033

## 2025-06-03 NOTE — HOSPITAL COURSE
Abad is a 74 y.o. male admitted on 6/2/2025 with Primary osteoarthritis of left knee [M17.12]  Knee pain [M25.569]. Principal problem is No Principal Problem: There is no principal problem currently on the Problem List. Please update the Problem List and refresh..    Past Medical History  Abad has a past medical history of Back pain, BPH (benign prostatic hyperplasia), Chronic kidney disease, stage 3a (CMS/[facility]), Fall (04/2025), Frequent urination, Hypertension, Lumbar herniated disc, Mitral valve stenosis and aortic valve stenosis, Neck pain, Pneumonia (2020), RSV infection (2023), Type 2 diabetes mellitus (CMS/[facility]), and Vertigo (2023).    History of Present Illness   S/p L TKA WBAT

## 2025-06-03 NOTE — PROGRESS NOTES
S/P L TKR  Patient evaluated at bedside  Pain well controlled but some mild confusion following PT   Pt denies any chest pain, SOB, N/V/D.     Labs:   WBC/Hgb/Hct/Plts: 9.58/12.0*/34.6*/200 (06/03 0400)    Vitals:    06/03/25 1100   BP: 132/63   Pulse: 83   Resp: 18   Temp: 36.4 °C (97.5 °F)   SpO2: 96%     L knee: Dressing CDI, ice packs anteriorly, calf soft NT, ROM deferred, DP/PT pulses 2+,  EHL, TA, Gastroc 5/5. SILT      A: POD #1 S/P L TKR    P:   d/w Attending Dr Huber  Pain control as needed- mild confusion per patient. narcotics given just prior to PT, Decreased dosage to 2.5 moderate and 5mg severe pain  DVT ppx  ASA 81mg PO BID   Med management: LHG  PT/OT, WBAT  Bowel regimen  Continue neuro check    dispo planning SNF vs home tomorrow pending clearances

## 2025-06-03 NOTE — PLAN OF CARE
Plan of Care Review  Plan of Care Reviewed With: patient  Progress: no change  Outcome Evaluation: aaox4. pain meds as requested. this afternoon, pt had brief episode of confusion - seen by ortho, attributed the confusion to his narcotic dose. pain meds adjusted to a lower dose. assistx2 oob, unsteady. able to make needs known. call  bell in reach. bed alarm on.

## 2025-06-03 NOTE — PLAN OF CARE
Care Coordination Admission Assessment Note    General Information:  Readmission Within the last 30 days: no previous admission in last 30 days  Does patient have a :    Patient-Specific Goals (include timeframe): To be discharged to home.    Living Arrangements:  Arrived From: home  Current Living Arrangements: home  People in Home: child(master), adult, spouse  Home Accessibility: railings on inside stairs, stairs within home (Group), stairs to enter home (Group)  Living Arrangement Comments: Resides with spouse, adult son and daughter in 2 story house with 1.5 steps to enter; has 1/2 bathroom on 1st floor, full bathroom and bedroom on 2nd floor.    Housing Stability and Utility Access (SDOH):  In the last 12 months, was there a time when you were not able to pay the mortgage or rent on time?: No  In the past 12 months, how many times have you moved?: 0  At any time in the past 12 months, were you homeless or living in a shelter (including now)?: No  In the past 12 months has the electric, gas, oil, or water company threatened to shut off services in your home?: No    Functional Status Prior to Admission:   Assistive Device/Animal Currently Used at Home: cane, quad, grab bar  Functional Status Comments: Reports independent with ADLs prior to admission, was not using assistive device for ambulation prior to admission; spouse and daughter are emergency contacts; POA is spouse.  IADL Comments: Reports independent with IADls prior to admission; spouse typically does the cooking but reports he is able when she is not home. Was driving prior to admission.     Supports and Services:  Current Outpatient/Agency/Support Group: none  Type of Current Home Care Services: none  History of home care episode or rehab stay:      Discharge Needs Assessment:   Concerns to be Addressed: care coordination/care conferences, discharge planning  Current Discharge Risk: none  Anticipated Changes Related to Illness:  none    Patient/Family Anticipated Discharge Plan:  Patient/Family Anticipates Transition To: skilled nursing facility  Patient/Family Anticipated Services at Transition: skilled nursing    Connection to Community  Not applicable    Patient Choice:   Offered/Gave Vendor List: no       Anticipated Discharge Plan:  Met with patient. Provided education and contact information for Care Coordination services.: yes  Anticipated Discharge Disposition: skilled nursing facility  Type of Skilled Nursing Care Services: nursing, PT, OT    Transportation Needs (SDOH):  Transportation Concerns: none  Transportation Anticipated: family or friend will provide  Is Out of Hospital DNR needed at discharge?: no    In the past 12 months, has lack of transportation kept you from medical appointments or from getting medications?: No  In the past 12 months, has lack of transportation kept you from meetings, work, or from getting things needed for daily living?: No    Concerns - comments: Met with patient at chairside; confirmed PCP and pharmacy, has prescription plan; reports spouse will be available to assist him upon discharge; will have ride home at DC. PT/OT rec SNF;  CC will follow up with patient.

## 2025-06-03 NOTE — ASSESSMENT & PLAN NOTE
- S/P left total knee arthroplasty on 6/2/25    - Pain management per ortho - adjusted on 6/3 due to confusion after med administration. Avoid over-sedation.   - DVT prophylaxis  - Pulmonary toilet  - Ambulate  - Hold all natural supplements. Resume post op when ok to do so per ortho

## 2025-06-03 NOTE — PLAN OF CARE
Care Coordination Discharge Plan Note     Discharge Needs Assessment  Concerns to be Addressed: care coordination/care conferences, discharge planning  Current Discharge Risk: none    Anticipated Discharge Plan  Anticipated Discharge Disposition: skilled nursing facility  Type of Skilled Nursing Care Services: nursing, PT, OT      Patient Choice  Offered/Gave Vendor List: no       ---------------------------------------------------------------------------------------------------------------------    Interdisciplinary Discharge Plan Review:  Participants:     Concerns Comments: Notified that patient being recommended for snf at d/c. Met with patient who states that he was in a lot of pain today, and feels this was causing him trouble in his PT session. Feels he should be able to do better tomorrow, however he and his wife at bedside are agreeable to snf if still recommended tomorrow. [facility] would like [facility] and [facility]. Will place referrals. Will update team.    Discharge Plan:   Disposition/Destination:   /    Discharge Facility:    Community Resources:      Discharge Transportation:  Is Out of Hospital DNR needed at Discharge: no  Does patient need discharge transport?

## 2025-06-03 NOTE — ASSESSMENT & PLAN NOTE
- Moderate AS on Echo Jan 2024. Known to [facility]    - Continue routine surveillance  - Continue BP management

## 2025-06-03 NOTE — PLAN OF CARE
Problem: Adult Inpatient Plan of Care  Goal: Plan of Care Review  Outcome: Progressing  Flowsheets (Taken 6/3/2025 1117)  Progress: improving  Outcome Evaluation: OT eval complete  Plan of Care Reviewed With: patient

## 2025-06-04 PROBLEM — Z96.652 HISTORY OF TOTAL LEFT KNEE REPLACEMENT: Status: ACTIVE | Noted: 2025-06-04

## 2025-06-04 LAB
ANION GAP SERPL CALC-SCNC: 4 MEQ/L (ref 3–15)
BUN SERPL-MCNC: 20 MG/DL (ref 7–25)
CALCIUM SERPL-MCNC: 9.9 MG/DL (ref 8.6–10.3)
CHLORIDE SERPL-SCNC: 106 MEQ/L (ref 98–107)
CO2 SERPL-SCNC: 27 MEQ/L (ref 21–31)
CREAT SERPL-MCNC: 1.1 MG/DL (ref 0.7–1.3)
EGFRCR SERPLBLD CKD-EPI 2021: >60 ML/MIN/1.73M*2
ERYTHROCYTE [DISTWIDTH] IN BLOOD BY AUTOMATED COUNT: 13 % (ref 11.6–14.4)
GLUCOSE BLD-MCNC: 122 MG/DL (ref 70–99)
GLUCOSE BLD-MCNC: 145 MG/DL (ref 70–99)
GLUCOSE BLD-MCNC: 71 MG/DL (ref 70–99)
GLUCOSE BLD-MCNC: 72 MG/DL (ref 70–99)
GLUCOSE SERPL-MCNC: 81 MG/DL (ref 70–99)
HCT VFR BLD AUTO: 31.7 % (ref 40.1–51)
HGB BLD-MCNC: 10.7 G/DL (ref 13.7–17.5)
MCH RBC QN AUTO: 28.8 PG (ref 28–33.2)
MCHC RBC AUTO-ENTMCNC: 33.8 G/DL (ref 32.2–36.5)
MCV RBC AUTO: 85.2 FL (ref 83–98)
PLATELET # BLD AUTO: 169 K/UL (ref 150–350)
PMV BLD AUTO: 9.1 FL (ref 9.4–12.4)
POCT TEST: ABNORMAL
POCT TEST: ABNORMAL
POCT TEST: NORMAL
POCT TEST: NORMAL
POTASSIUM SERPL-SCNC: 3.9 MEQ/L (ref 3.5–5.1)
RBC # BLD AUTO: 3.72 M/UL (ref 4.5–5.8)
SODIUM SERPL-SCNC: 137 MEQ/L (ref 136–145)
WBC # BLD AUTO: 8.12 K/UL (ref 3.8–10.5)

## 2025-06-04 PROCEDURE — 80048 BASIC METABOLIC PNL TOTAL CA: CPT | Performed by: NURSE PRACTITIONER

## 2025-06-04 PROCEDURE — 97116 GAIT TRAINING THERAPY: CPT | Mod: GP,CQ

## 2025-06-04 PROCEDURE — 97166 OT EVAL MOD COMPLEX 45 MIN: CPT | Mod: GO

## 2025-06-04 PROCEDURE — 85027 COMPLETE CBC AUTOMATED: CPT | Performed by: NURSE PRACTITIONER

## 2025-06-04 PROCEDURE — 12000000 HC ROOM AND CARE MED/SURG

## 2025-06-04 PROCEDURE — 63700000 HC SELF-ADMINISTRABLE DRUG: Performed by: NURSE PRACTITIONER

## 2025-06-04 PROCEDURE — 99232 SBSQ HOSP IP/OBS MODERATE 35: CPT | Mod: FS | Performed by: INTERNAL MEDICINE

## 2025-06-04 PROCEDURE — 97530 THERAPEUTIC ACTIVITIES: CPT | Mod: GO

## 2025-06-04 PROCEDURE — 36415 COLL VENOUS BLD VENIPUNCTURE: CPT | Performed by: NURSE PRACTITIONER

## 2025-06-04 PROCEDURE — 97110 THERAPEUTIC EXERCISES: CPT | Mod: GP,CQ

## 2025-06-04 PROCEDURE — 97530 THERAPEUTIC ACTIVITIES: CPT | Mod: GP,CQ

## 2025-06-04 RX ORDER — AMOXICILLIN 250 MG
1 CAPSULE ORAL 2 TIMES DAILY
Qty: 60 TABLET | Refills: 0 | Status: CANCELLED | OUTPATIENT
Start: 2025-06-04 | End: 2025-07-04

## 2025-06-04 RX ORDER — AMLODIPINE BESYLATE 10 MG/1
10 TABLET ORAL DAILY
Status: DISCONTINUED | OUTPATIENT
Start: 2025-06-05 | End: 2025-06-05 | Stop reason: HOSPADM

## 2025-06-04 RX ORDER — TRAMADOL HYDROCHLORIDE 50 MG/1
25-50 TABLET ORAL EVERY 4 HOURS PRN
Status: DISCONTINUED | OUTPATIENT
Start: 2025-06-04 | End: 2025-06-05 | Stop reason: HOSPADM

## 2025-06-04 RX ORDER — OXYCODONE HYDROCHLORIDE 5 MG/1
2.5 TABLET ORAL EVERY 4 HOURS PRN
Qty: 30 TABLET | Refills: 0 | Status: CANCELLED | OUTPATIENT
Start: 2025-06-04 | End: 2025-06-14

## 2025-06-04 RX ORDER — AMLODIPINE BESYLATE 5 MG/1
5 TABLET ORAL ONCE
Status: COMPLETED | OUTPATIENT
Start: 2025-06-04 | End: 2025-06-04

## 2025-06-04 RX ORDER — LOSARTAN POTASSIUM 50 MG/1
50 TABLET ORAL DAILY
Status: DISCONTINUED | OUTPATIENT
Start: 2025-06-05 | End: 2025-06-05 | Stop reason: HOSPADM

## 2025-06-04 RX ADMIN — TRAMADOL HYDROCHLORIDE 50 MG: 50 TABLET, COATED ORAL at 21:36

## 2025-06-04 RX ADMIN — ASPIRIN 81 MG CHEWABLE TABLET 81 MG: 81 TABLET CHEWABLE at 09:01

## 2025-06-04 RX ADMIN — PANTOPRAZOLE SODIUM 40 MG: 40 TABLET, DELAYED RELEASE ORAL at 06:48

## 2025-06-04 RX ADMIN — SENNOSIDES, DOCUSATE SODIUM 1 TABLET: 50; 8.6 TABLET, FILM COATED ORAL at 20:31

## 2025-06-04 RX ADMIN — METFORMIN HYDROCHLORIDE 500 MG: 500 TABLET ORAL at 09:03

## 2025-06-04 RX ADMIN — ACETAMINOPHEN 650 MG: 325 TABLET ORAL at 06:48

## 2025-06-04 RX ADMIN — AMLODIPINE BESYLATE 5 MG: 5 TABLET ORAL at 09:01

## 2025-06-04 RX ADMIN — ASPIRIN 81 MG CHEWABLE TABLET 81 MG: 81 TABLET CHEWABLE at 20:31

## 2025-06-04 RX ADMIN — LATANOPROST 1 DROP: 50 SOLUTION OPHTHALMIC at 21:39

## 2025-06-04 RX ADMIN — AMLODIPINE BESYLATE 5 MG: 5 TABLET ORAL at 14:35

## 2025-06-04 RX ADMIN — SENNOSIDES, DOCUSATE SODIUM 1 TABLET: 50; 8.6 TABLET, FILM COATED ORAL at 09:02

## 2025-06-04 RX ADMIN — INSULIN GLARGINE 18 UNITS: 100 INJECTION, SOLUTION SUBCUTANEOUS at 21:37

## 2025-06-04 RX ADMIN — EMPAGLIFLOZIN 25 MG: 25 TABLET, FILM COATED ORAL at 09:02

## 2025-06-04 RX ADMIN — TERAZOSIN HYDROCHLORIDE 6 MG: 1 CAPSULE ORAL at 21:35

## 2025-06-04 RX ADMIN — DULOXETINE 60 MG: 60 CAPSULE, DELAYED RELEASE ORAL at 21:35

## 2025-06-04 ASSESSMENT — COGNITIVE AND FUNCTIONAL STATUS - GENERAL
HELP NEEDED FOR BATHING: 2 - A LOT
STANDING UP FROM CHAIR USING ARMS: 2 - A LOT
TOILETING: 2 - A LOT
CLIMB 3 TO 5 STEPS WITH RAILING: 2 - A LOT
WALKING IN HOSPITAL ROOM: 2 - A LOT
MOVING TO AND FROM BED TO CHAIR: 2 - A LOT
CLIMB 3 TO 5 STEPS WITH RAILING: 2 - A LOT
MOVING TO AND FROM BED TO CHAIR: 2 - A LOT
HELP NEEDED FOR PERSONAL GROOMING: 3 - A LITTLE
WALKING IN HOSPITAL ROOM: 3 - A LITTLE
DRESSING REGULAR UPPER BODY CLOTHING: 3 - A LITTLE
STANDING UP FROM CHAIR USING ARMS: 2 - A LOT
AFFECT: WFL;ANXIOUS
AFFECT: WFL
DRESSING REGULAR LOWER BODY CLOTHING: 2 - A LOT
EATING MEALS: 4 - NONE
WALKING IN HOSPITAL ROOM: 3 - A LITTLE
CLIMB 3 TO 5 STEPS WITH RAILING: 2 - A LOT
STANDING UP FROM CHAIR USING ARMS: 2 - A LOT
MOVING TO AND FROM BED TO CHAIR: 2 - A LOT

## 2025-06-04 NOTE — PROGRESS NOTES
Occupational Therapy -  Daily Treatment/Progress Note     Patient: Abad Hinkle  Location: [place] [address]  MRN: [id number]  Today's date: 6/4/2025    HISTORY OF PRESENT ILLNESS     Abad is a 74 y.o. male admitted on 6/2/2025 with Primary osteoarthritis of left knee [M17.12]  Knee pain [M25.569]. Principal problem is No Principal Problem: There is no principal problem currently on the Problem List. Please update the Problem List and refresh..    Past Medical History  Abad has a past medical history of Back pain, BPH (benign prostatic hyperplasia), Chronic kidney disease, stage 3a (CMS/Formerly [facility]), Fall (04/2025), Frequent urination, Hypertension, Lumbar herniated disc, Mitral valve stenosis and aortic valve stenosis, Neck pain, Pneumonia (2020), RSV infection (2023), Type 2 diabetes mellitus (CMS/Formerly [facility]), and Vertigo (2023).    History of Present Illness   S/p L TKA WBAT    PRIOR LEVEL OF FUNCTION AND LIVING ENVIRONMENT     Prior Level of Function      Flowsheet Row Most Recent Value   Dominant Hand right   Ambulation independent   Transferring independent   Toileting independent   Bathing independent   Dressing independent   Eating independent   IADLs independent   Driving/Transportation    Communication understands/communicates without difficulty   Prior Level of Function Comment Pt has quad cane but was not using it prior to surgery, otherwise independent in BADL tasks. retired  and drive   Assistive Device Currently Used at Home cane, quad, grab bar   History of Falls: Two or more falls in the past year    Prior Living Environment      Flowsheet Row Most Recent Value   People in Home child(master), adult, spouse   Current Living Arrangements home   Home Accessibility railings on inside stairs, stairs within home (Group), stairs to enter home (Group)   Living Environment Comment 2SH with 2STE through garage. plans to sleep in recliner chair on first floor with powder  room.  FF to second floor tub/shower and WIS +SC and standard toilet with safety frame     Occupational Profile      Flowsheet Row Most Recent Value   Successful Occupations retired   Performance Patterns drives   Environmental Supports and Barriers lives with spouse, son, and one of his daughters       VITALS AND PAIN     OT Vitals      Date/Time Pulse SpO2 Pt Activity O2 Therapy BP BP Location BP Method Pt Position [facility]   06/04/25 1002 89 100 % At rest None (Room air) 144/61 Left upper arm Automatic Sitting MD          OT Pain      Date/Time Pain Type Side/Orientation Location Rating: Rest Rating: Activity Interventions [facility]   06/04/25 1002 Pain Assessment knee left 2 - mild pain 4 - moderate pain care clustered MD               Objective     OBJECTIVE     Start time:  1002  End time:  1026  Session Length: 24 min  Mode of Treatment: co-treatment  Reason for co-treatment: limited due to pain benefits from clustered care    General Observations  Patient received reclined, in chair, in therapy gym. He was no issues or concerns identified by nurse prior to session, agreeable to therapy.      Precautions: fall, weight bearing  Extremity Weight-Bearing Status: left lower extremity  Left LE Weight-Bearing Status: weight-bearing as tolerated (WBAT)     Services  Do You Speak a Language Other Than English at Home?: no  Is an  Needed/Used?: No      OT Eval and Treat - 06/04/25 1040          Cognition    Orientation Status oriented x 3     Affect/Mental Status WFL     Follows Commands WFL     Cognitive Function safety deficit     Safety Deficit minimal deficit;insight into deficits/self-awareness;judgment;safety precautions awareness        Bed Mobility    Bed Mobility Activities supine to sit;sit to supine     [place] minimum assist (75% or more patient effort)     Safety/Cues increased time to complete     Assistive Device none     Comment min A for LLE management in and out of bed, able to  complete with increased time and VCs for sequencing        Mobility Belt    Mobility Belt Used During Session yes        Sit/Stand Transfer    Surface chair with arm rests     [place] moderate assist (50-74% patient effort);1 person assist     Safety/Cues increased time to complete;moderate;verbal cues;sequencing;technique     Assistive Device walker, front-wheeled     Transfer Comments boosting assisstance provided, VCs to shift weight anteriorly with fair (-) carryover. increased time and exertion to transition hands to RW.        Toilet Transfer    Transfer Technique sit/stand     [place] moderate assist (50-74% patient effort);1 person assist     Safety/Cues increased time to complete;moderate;verbal cues;sequencing;technique     Assistive Device commode, 3-in-1;walker, front-wheeled     Comment +posterior LOB when transitioning hands to RW requiring A for correction        Functional Mobility    Distance in therapy gym     Functional Mobility [place] minimum assist (75% or more patient effort);1 person assist     Safety/Cues minimal;verbal cues;sequencing;hand placement     Assistive Device walker, front-wheeled     Functional Mobility Comments Able to complete short household distance with RW, unsteady and limited tolerance due to pain.        Lower Body Dressing    Tasks doff;don;socks     [place] moderate assist (50-74% patient effort)     Safety/Cues increased time to complete     Position supported sitting     Adaptive Equipment none     Comment limited reach due to pain        Balance    Static Sitting Balance WFL     Dynamic Sitting Balance mild impairment     Sit to Stand Dynamic Balance moderate impairment;supported     Static Standing Balance mild impairment;supported     Dynamic Standing Balance moderate impairment;supported     Comment, Balance with RW, +posterior LOB                     Functional Reach Test  Trial One: Functional Reach Test (in): 0 inches (unable to  tolerate)  Trial Two: Functional Reach Test (in): 0 inches  Average (in): 0 inches                        Education Documentation  No documentation found.      Session Outcome  Patient reclined, in therapy gym, in chair at end of session, returned to room by therapy aide, all needs met. Nursing notified about patient's performance, patient's position, and patient's response to therapy/activity.    AM-PAC™ - ADL (Current Function)     Putting on/taking off regular lower body clothing 2 - A Lot   Bathing 2 - A Lot   Toileting 2 - A Lot   Putting on/taking off regular upper body clothing 3 - A Little   Help for taking care of personal grooming 3 - A Little   Eating meals 4 - None   AM-PAC™ ADL Score 16          ASSESSMENT AND PLAN     Progress Summary  OT treatment complete. Mod A for functional transfers and min A with RW for functional mobility, able to tolerate short household distance with RW. Limited by pain impairing strength, endurance, and balance during BADL tasks. Requires A for distal LBD at this time, would benefit from  AD education. Continue OT Services to address ADL dysfunction. Rec SNF at d/c    Patient/Family Therapy Goal Statement: to go home    OT Plan      Flowsheet Row Most Recent Value   Rehab Potential good, to achieve stated therapy goals at 06/04/2025 1040   Therapy Frequency 4 times/wk at 06/04/2025 1040   Planned Therapy Interventions adaptive equipment training, activity tolerance training, functional balance retraining, strengthening exercise, transfer/mobility retraining at 06/04/2025 1040       OT Discharge Recommendations      Flowsheet Row Most Recent Value   OT Recommended Discharge Disposition skilled nursing facility at 06/04/2025 1040   Anticipated Equipment Needs if Discharged Home (OT) bathing equipment, dressing equipment, reacher, walker, front-wheeled, commode, 3-in-1 at 06/04/2025 1040            OT Goals      Flowsheet Row Most Recent Value   Bed Mobility Goal 1     Activity/Assistive Device bed mobility activities, all at 06/03/2025 1033   [place] modified independence at 06/03/2025 1033   Time Frame by discharge at 06/03/2025 1033   Progress/Outcome goal ongoing at 06/03/2025 1033   Transfer Goal 1    Activity/Assistive Device toilet at 06/03/2025 1033   [place] modified independence at 06/03/2025 1033   Time Frame by discharge at 06/03/2025 1033   Progress/Outcome goal ongoing at 06/03/2025 1033   Transfer Goal 2    Activity/Assistive Device shower at 06/03/2025 1033   [place] supervision required at 06/03/2025 1033   Time Frame by discharge at 06/03/2025 1033   Progress/Outcome goal ongoing at 06/03/2025 1033   Dressing Goal 1    Activity/Adaptive Equipment dressing skills, all at 06/03/2025 1033   [place] modified independence at 06/03/2025 1033   Time Frame by discharge at 06/03/2025 1033   Progress/Outcome goal ongoing at 06/03/2025 1033

## 2025-06-04 NOTE — PROGRESS NOTES
Inpatient Cardiology   Daily Progress Note       Overview:     - S/P left total knee arthroplasty on 6/2/25  - PT/OT recommending SNF       Assessment/Plan   Assessment & Plan  Aortic stenosis, moderate  - Moderate AS on Echo Jan 2024. Known to [facility]    - Continue routine surveillance  - Continue BP management   BPH with urinary obstruction  - Continue Terazosin.   - Monitor I&O; PVR/bladder scan if needed  Stage 3a chronic kidney disease (CMS/[facility])  - Follow renal function post op  - Avoid nephrotoxins  Obstructive sleep apnea  - Does not use CPAP    - EtC02 monitoring per protocol   - Monitor for signs or symptoms of hypercapnea   - Aggressive pulmonary toilet  - GERD/aspiration precautions    Mixed hyperlipidemia  - Resume Zetia when ok with ortho   Essential hypertension  -  Continue Amlodipine- adjust to usual 10mg daily   - Continue Terazosin  - Resume Losartan as sub for Olmesartan   Type 2 diabetes mellitus with chronic kidney disease, with long-term current use of insulin (CMS/[facility])  - Continue Jardiance and Metformin BID  - Lantus 18 units qHS  - Accuchecks and SSIC PRN  - Resume Mounjaro on d/c  Primary osteoarthritis of left knee  - S/P left total knee arthroplasty on 6/2/25    - Pain management per ortho - adjusted on 6/3 due to confusion after med administration. Resolved and now on Tramadol  - Avoid over-sedation.   - DVT prophylaxis  - Pulmonary toilet  - Ambulate  - Hold all natural supplements. Resume post op when ok to do so per ortho           Subjective   OOB to chair. Pain controlled. Mentation much clearer today - denies confusion or fogginess. Hopeful he doesn't need SNF but awaiting PT/OT recs. D/W nursing.       Current Medications:    Scheduled:   amLODIPine  5 mg oral BID    aspirin  81 mg oral BID    DULoxetine  60 mg oral Nightly    empagliflozin  25 mg oral q AM    insulin glargine U-100  18 Units subcutaneous Nightly    insulin lispro U-100  6-10 Units subcutaneous With  meals & nightly    latanoprost  1 drop Both Eyes Nightly    metFORMIN  500 mg oral BID with breakfast and dinner    pantoprazole  40 mg oral Daily before breakfast    polyethylene glycol  17 g oral Daily    sennosides-docusate sodium  1 tablet oral BID    terazosin  6 mg oral Nightly       Infusions:      PRN:    alum-mag hydroxide-simeth    glucose **OR** dextrose **OR** glucagon **OR** dextrose 50 % in water (D50)    diphenhydrAMINE **OR** diphenhydrAMINE    ondansetron ODT **OR** ondansetron    tiZANidine    traMADoL     Objective   Vital signs in last 24 hours:  Temp:  [36.2 °C (97.1 °F)-36.9 °C (98.4 °F)] 36.2 °C (97.1 °F)  Heart Rate:  [83-97] 89  Resp:  [18] 18  BP: (132-171)/(61-76) 144/61    Weights (last 5 days)       Date/Time Weight    06/02/25 1700 106 kg (234 lb 9.1 oz)    06/02/25 0834 105 kg (232 lb)            Intake/Output Summary (Last 24 hours) at 6/4/2025 0659  Last data filed at 6/3/2025 2137  24 Hour Net Input/Output from 7AM Yesterday   Intake --   Output 500 ml   Net -500 ml     Net IO Since Admission: -250 mL [06/04/25 1056]    Physical Exam  Vitals and nursing note reviewed.   HENT:      Head: Normocephalic.      Mouth/Throat:      Mouth: Mucous membranes are moist.   Eyes:      General: No scleral icterus.  Cardiovascular:      Rate and Rhythm: Normal rate and regular rhythm.      Heart sounds: Murmur (II/VI AKBAR at LSB with radiation to apex) heard.   Pulmonary:      Effort: Pulmonary effort is normal. No respiratory distress.      Breath sounds: No wheezing.   Abdominal:      General: There is no distension.      Palpations: Abdomen is soft.      Tenderness: There is no abdominal tenderness.   Musculoskeletal:      Right lower leg: No edema.      Left lower leg: No edema.   Skin:     General: Skin is warm and dry.   Neurological:      Mental Status: He is alert and oriented to person, place, and time.   Psychiatric:         Mood and Affect: Mood normal.              Labs and Data:       Hematology    Results from last 7 days   Lab Units 06/04/25  0501 06/03/25  0400   WBC K/uL 8.12 9.58   HEMOGLOBIN g/dL 10.7* 12.0*   HEMATOCRIT % 31.7* 34.6*   PLATELETS K/uL 169 200       Chemistries    Results from last 7 days   Lab Units 06/04/25  0501 06/03/25  0400   SODIUM mEQ/L 137 133*   POTASSIUM mEQ/L 3.9 4.2   CHLORIDE mEQ/L 106 101   CREATININE mg/dL 1.1 1.2   BUN mg/dL 20 20   CO2 mEQ/L 27 27   GLUCOSE mg/dL 81 164*   CALCIUM mg/dL 9.9 9.4        Radiology:  I have independently reviewed the pertinent imaging from the last 24 hrs.    X-RAY KNEE LEFT 1 OR 2 VIEWS  Result Date: 6/2/2025  IMPRESSION: Please see comment.                           LORENA Hatch  6/4/2025       Number Of Freeze-Thaw Cycles: 2 freeze-thaw cycles Duration Of Freeze Thaw-Cycle (Seconds): 5 Post-Care Instructions: I reviewed with the patient in detail post-care instructions. Patient is to wear sunprotection, and avoid picking at any of the treated lesions. Pt may apply Vaseline to crusted or scabbing areas. Render Post-Care Instructions In Note?: yes Detail Level: Detailed Consent: The patient's consent was obtained including but not limited to risks of crusting, scabbing, blistering, scarring, darker or lighter pigmentary change, recurrence, incomplete removal and infection.

## 2025-06-04 NOTE — PLAN OF CARE
Care Coordination Discharge Plan Note     Discharge Needs Assessment  Concerns to be Addressed: care coordination/care conferences, discharge planning  Current Discharge Risk: none    Anticipated Discharge Plan  Anticipated Discharge Disposition: skilled nursing facility  Type of Skilled Nursing Care Services: nursing, PT, OT      Patient Choice  Offered/Gave Vendor List: no       ---------------------------------------------------------------------------------------------------------------------    Interdisciplinary Discharge Plan Review:  Participants:     Concerns Comments: Continuing to follow .Multiple additional referrals made as no response from [facility], and other facilities unable to accept.  Left 2 VM for [facility] again this am.  [facility], [facility], [facility] all able to accept.  Also sent to [facility] and spoke with Laina in admissions who will review.  Met with patient and spouse at bedside. They are agreeable to [facility], [facility] or [facility]. Will await response from [facility] and continue to follow.    Spoke with Laina in admissions at [facility]. She is able to accept and will obtain authorization.  Met with patient and spouse who are agreeable to facility and plan of d/c tomorrow 6/5. Discussed w/c van transport and private cost.  Patient and spouse agreeable. Will update team and await [facility] authorization.     Discharge Plan:   Disposition/Destination:   /    Discharge Facility:   Community Resources:      Discharge Transportation:  Is Out of Hospital DNR needed at Discharge: no  Does patient need discharge transport?

## 2025-06-04 NOTE — ASSESSMENT & PLAN NOTE
- Moderate AS on Echo Jan 2024. Known to [facility]    - Continue routine surveillance  - Continue BP management

## 2025-06-04 NOTE — PLAN OF CARE
Plan of Care Review  Plan of Care Reviewed With: patient  Progress: improving  Outcome Evaluation: aaox4. mentation much clearer today. pain well controlled. voids in urinal. assistx2 oob. able to make needs known. call bell in reach. bed alarm on.

## 2025-06-04 NOTE — PROGRESS NOTES
Physical Therapy -  Daily Treatment/Progress Note     Patient: Abad Hinkle  Location: [place] [address]  MRN: [id number]  Today's date: 6/4/2025    HISTORY OF PRESENT ILLNESS     Abad is a 74 y.o. male admitted on 6/2/2025 with Primary osteoarthritis of left knee [M17.12]  Knee pain [M25.569]. Principal problem is No Principal Problem: There is no principal problem currently on the Problem List. Please update the Problem List and refresh..    Past Medical History  Abad has a past medical history of Back pain, BPH (benign prostatic hyperplasia), Chronic kidney disease, stage 3a (CMS/[facility]), Fall (04/2025), Frequent urination, Hypertension, Lumbar herniated disc, Mitral valve stenosis and aortic valve stenosis, Neck pain, Pneumonia (2020), RSV infection (2023), Type 2 diabetes mellitus (CMS/[facility]), and Vertigo (2023).    History of Present Illness   S/p L TKA WBAT    PRIOR LEVEL OF FUNCTION AND LIVING ENVIRONMENT     Prior Level of Function      Flowsheet Row Most Recent Value   Dominant Hand right   Ambulation independent   Transferring independent   Toileting independent   Bathing independent   Dressing independent   Eating independent   IADLs independent   Driving/Transportation    Communication understands/communicates without difficulty   Prior Level of Function Comment Pt has quad cane but was not using it prior to surgery, otherwise independent in BADL tasks. retired  and drive   Assistive Device Currently Used at Home cane, quad, grab bar        Prior Living Environment      Flowsheet Row Most Recent Value   People in Home child(master), adult, spouse   Current Living Arrangements home   Home Accessibility railings on inside stairs, stairs within home (Group), stairs to enter home (Group)   Living Environment Comment 2SH with 2STE through garage. plans to sleep in recliner chair on first floor with powder room.  FF to second floor tub/shower and WIS +SC and  "standard toilet with safety frame       VITALS AND PAIN     PT Vitals      Date/Time Pulse SpO2 Pt Activity O2 Therapy BP BP Location BP Method Pt Position [facility]   06/04/25 1002 89 100 % At rest None (Room air) 144/61 Left upper arm Automatic Sitting MD          PT Pain      Date/Time Pain Type Side/Orientation Location Rating: Rest Rating: Activity Interventions [facility]   06/04/25 1002 Pain Assessment left knee 2 - mild pain 4 - moderate pain care clustered MD               Objective     OBJECTIVE     Start time:  1002  End time:  1050  Session Length: 48 min  Mode of Treatment: co-treatment  Reason for co-treatment: co-tx. w/ OT to expedite d/c    General Observations  Patient received upright, in chair, leg(s) elevated, in therapy gym. He was no issues or concerns identified by nurse prior to session, agreeable to therapy. Pt. rec'd for tx. in dept., pleasant & agreeable to tx. Pt. states \"I want to go home.\"    Precautions: fall, weight bearing  Extremity Weight-Bearing Status: left lower extremity  Left LE Weight-Bearing Status: weight-bearing as tolerated (WBAT)    Limitations/Impairments: safety/cognitive   Services  Is an  Needed/Used?: No      PT Eval and Treat - 06/04/25 1002          Cognition    Orientation Status oriented x 3     Affect/Mental Status WFL;anxious     Follows Commands WFL     Cognitive Function safety deficit     Safety Deficit minimal deficit;insight into deficits/self-awareness;judgment;safety precautions awareness        Lower Extremity Range of Motion    Knee, Left (ROM) AROM 5-75 degrees        Bed Mobility    Bed Mobility Activities sit to supine;supine to sit     [place] minimum assist (75% or more patient effort)     Safety/Cues increased time to complete;verbal cues;technique;sequencing     Assistive Device --   mat    Comment Gentle assist with L LE support        Mobility Belt    Mobility Belt Used During Session yes        Sit/Stand Transfer    Surface " chair with arm rests;mat     [place] moderate assist (50-74% patient effort)     Safety/Cues verbal cues;hand placement     Assistive Device walker, front-wheeled     Transfer Comments Gentle assist with initial leverage & static standing @ RW.        Car Transfer    Transfer Technique sit-stand;stand-sit     [place] minimum assist (75% or more patient effort)     Safety/Cues verbal cues;sequencing;technique     Assistive Device walker, front-wheeled        Gait Training    [place], Gait minimum assist (75% or more patient effort)     Safety/Cues increased time to complete;technique;proper use of assistive device     Assistive Device walker, front-wheeled     Distance in Feet 24 feet     Pattern step-to;step-through     Deviations/Abnormal Patterns antalgic;gait speed decreased;step length decreased     Comment Pt. amb. 24 ft. x 2, very slow, mildly antalgic, fwd. flexed with cues for posture.        Balance    Static Sitting Balance WFL;sitting in chair     Dynamic Sitting Balance mild impairment;sitting in chair     Sit to Stand Dynamic Balance moderate impairment;supported     Static Standing Balance mild impairment;supported     Dynamic Standing Balance moderate impairment;supported        Lower Extremity (Therapeutic Exercise)    Exercise Position/Type seated     General Exercise ankle pumps;bilateral;LAQ (long arc quad);quad sets     Range of Motion Exercises left;knee flexion/extension     Reps and Sets 10/1     Comment Gentle ROM, L LE, good mara.                      10 Meter Walk Test (Self-Selected Velocity)  Trial One: Ten Meter Walk Test (sec): 0 seconds (unable to achieve gait requirements)                          Session Outcome  Patient upright, in chair, leg(s) elevated, in therapy gym at end of session, all needs met, returned to room by therapy aide. Nursing notified about patient's performance, patient's position, and patient's response to therapy/activity.    AM-PAC™ - Mobility  (Current Function)     Turning form your back to your side while in flat bed without using bedrails 3 - A Little   Moving from lying on your back to sitting on the side of a flat bed without using bedrails 3 - A Little   Moving to and from a bed to a chair 2 - A Lot   Standing up from a chair using your arms 2 - A Lot   To walk in a hospital room 3 - A Little   Climbing 3-5 steps with a railing 2 - A Lot   AM-PAC™ Mobility Score 15          ASSESSMENT AND PLAN     Progress Summary  Pt. currently min to mod assist with overall function. Pt. states desire to go home, yet, appears unsafe to go home sans assist @ this time. Pt. would benefit from skilled services in a SNF setting to achieve max functional Ind.    Patient/Family Therapy Goals Statement: Pt wants to be independent again and return to prior level of function.    PT Plan      Flowsheet Row Most Recent Value   Rehab Potential good, to achieve stated therapy goals at 06/03/2025 1030   Therapy Frequency daily at 06/03/2025 1030   Planned Therapy Interventions balance training, home exercise program, bed mobility training, gait training, stretching, strengthening, ROM (range of motion), stair training, patient/family education, transfer training at 06/03/2025 1030       PT Discharge Recommendations      Flowsheet Row Most Recent Value   PT Recommended Discharge Disposition skilled nursing facility at 06/03/2025 1030   Anticipated Equipment Needs if Discharged Home (PT) walker, front-wheeled at 06/03/2025 1030            PT Goals      Flowsheet Row Most Recent Value   Bed Mobility Goal 1    Activity/Assistive Device bed mobility activities, all at 06/03/2025 1030   [place] modified independence at 06/03/2025 1030   Time Frame by discharge at 06/03/2025 1030   Progress/Outcome goal ongoing at 06/03/2025 1030   Transfer Goal 1    Activity/Assistive Device sit-to-stand/stand-to-sit, car transfer at 06/03/2025 1030   [place] modified independence at  06/03/2025 1030   Time Frame by discharge at 06/03/2025 1030   Progress/Outcome goal ongoing at 06/03/2025 1030   Gait Training Goal 1    Activity/Assistive Device gait (walking locomotion), walker, front-wheeled at 06/03/2025 1030   [place] modified independence at 06/03/2025 1030   Distance 60ft at 06/03/2025 1030   Time Frame by discharge at 06/03/2025 1030   Progress/Outcome goal ongoing at 06/03/2025 1030   Stairs Goal 1    Activity/Assistive Device descending stairs, ascending stairs at 06/03/2025 1030   [place] modified independence at 06/03/2025 1030   Number of Stairs 4 at 06/03/2025 1030   Time Frame by discharge at 06/03/2025 1030   Progress/Outcome goal ongoing at 06/03/2025 1030

## 2025-06-04 NOTE — ASSESSMENT & PLAN NOTE
- S/P left total knee arthroplasty on 6/2/25    - Pain management per ortho - adjusted on 6/3 due to confusion after med administration. Resolved and now on Tramadol  - Avoid over-sedation.   - DVT prophylaxis  - Pulmonary toilet  - Ambulate  - Hold all natural supplements. Resume post op when ok to do so per ortho

## 2025-06-04 NOTE — ASSESSMENT & PLAN NOTE
-  Continue Amlodipine- adjust to usual 10mg daily   - Continue Terazosin  - Resume Losartan as sub for Olmesartan

## 2025-06-04 NOTE — PLAN OF CARE
"Plan of Care Review  Plan of Care Reviewed With: patient  Progress: improving  Outcome Evaluation: Patient awake and alert. Oriented x4. Pt following commands and answering questions appropriately. Pt reported acceptable level of pain. Declined Oxy IR throughout the shift and stated that \"Tylenol is enough.\" Lt knee dressing c/d/I. Tolerating ordered diet. Denies nausea and vomiting. Ambulating to side of bed to use urinal. Pt resting quietly in bed with needs met. Call bell within reach. Bed alarm in place.        Problem: Adult Inpatient Plan of Care  Goal: Optimal Comfort and Wellbeing  Outcome: Progressing     Problem: Adult Inpatient Plan of Care  Goal: Readiness for Transition of Care  Outcome: Progressing     Problem: Knee Arthroplasty  Goal: Absence of Bleeding  Outcome: Progressing     Problem: Pain Acute  Goal: Optimal Pain Control and Function  Outcome: Progressing     Problem: Adult Inpatient Plan of Care  Goal: Patient-Specific Goal (Individualized)  Flowsheets (Taken 6/4/2025 6603)  Patient/Family-Specific Goals (Include Timeframe): Decrease pain     "

## 2025-06-04 NOTE — PROGRESS NOTES
Ortho Note    Pain controlled; denies SOB/CP; denies N/V     pt seen and examined bedside; resting comfortably   VSS; no acute distress, A&O,   LLE dressing CDI   +DP/PT pulses; foot warm, pink, brisk cap refill   expected postop edema  Compartments soft and compressible  SILT BL DF/PF/EHL  HGB 10.7 Drop in HgB due to acute blood loss anemia- expected from surgery-pt asymptomatic    A/P   POD 2 s/p L TKA  -pain control  -DVT ppx: SCDS, early ambulation, ASA 81mg BID x 4 weeks   -PT/OT OOB WBAT  -Monitor I&Os   -Neurovascular checks   -medical management per G      Dispo planning: possible d/c rehab pending clearances

## 2025-06-05 ENCOUNTER — APPOINTMENT (INPATIENT)
Dept: RADIOLOGY | Facility: HOSPITAL | Age: 75
DRG: 470 | End: 2025-06-05
Attending: NURSE PRACTITIONER
Payer: COMMERCIAL

## 2025-06-05 VITALS
HEART RATE: 102 BPM | WEIGHT: 234.57 LBS | RESPIRATION RATE: 17 BRPM | OXYGEN SATURATION: 94 % | TEMPERATURE: 97.7 F | BODY MASS INDEX: 37.7 KG/M2 | DIASTOLIC BLOOD PRESSURE: 72 MMHG | HEIGHT: 66 IN | SYSTOLIC BLOOD PRESSURE: 168 MMHG

## 2025-06-05 LAB
GLUCOSE BLD-MCNC: 166 MG/DL (ref 70–99)
GLUCOSE BLD-MCNC: 68 MG/DL (ref 70–99)
GLUCOSE BLD-MCNC: 75 MG/DL (ref 70–99)
POCT TEST: ABNORMAL
POCT TEST: ABNORMAL
POCT TEST: NORMAL

## 2025-06-05 PROCEDURE — 63700000 HC SELF-ADMINISTRABLE DRUG: Performed by: NURSE PRACTITIONER

## 2025-06-05 PROCEDURE — 99232 SBSQ HOSP IP/OBS MODERATE 35: CPT | Mod: FS | Performed by: INTERNAL MEDICINE

## 2025-06-05 PROCEDURE — 74022 RADEX COMPL AQT ABD SERIES: CPT

## 2025-06-05 RX ORDER — TRAMADOL HYDROCHLORIDE 50 MG/1
25-50 TABLET ORAL EVERY 4 HOURS PRN
Qty: 30 TABLET | Refills: 0 | Status: SHIPPED | OUTPATIENT
Start: 2025-06-05 | End: 2025-06-10

## 2025-06-05 RX ORDER — NAPROXEN SODIUM 220 MG/1
81 TABLET, FILM COATED ORAL 2 TIMES DAILY
Start: 2025-06-05 | End: 2025-07-05

## 2025-06-05 RX ORDER — LOSARTAN POTASSIUM 50 MG/1
50 TABLET ORAL ONCE
Status: COMPLETED | OUTPATIENT
Start: 2025-06-05 | End: 2025-06-05

## 2025-06-05 RX ORDER — ACETAMINOPHEN 325 MG/1
650 TABLET ORAL EVERY 6 HOURS PRN
Start: 2025-06-05 | End: 2025-07-05

## 2025-06-05 RX ORDER — AMOXICILLIN 250 MG
1 CAPSULE ORAL 2 TIMES DAILY
Start: 2025-06-05 | End: 2025-07-05

## 2025-06-05 RX ADMIN — SENNOSIDES, DOCUSATE SODIUM 1 TABLET: 50; 8.6 TABLET, FILM COATED ORAL at 08:08

## 2025-06-05 RX ADMIN — LOSARTAN POTASSIUM 50 MG: 50 TABLET ORAL at 08:07

## 2025-06-05 RX ADMIN — PANTOPRAZOLE SODIUM 40 MG: 40 TABLET, DELAYED RELEASE ORAL at 06:02

## 2025-06-05 RX ADMIN — SODIUM PHOSPHATE, DIBASIC AND SODIUM PHOSPHATE, MONOBASIC 1 ENEMA: 7; 19 ENEMA RECTAL at 15:20

## 2025-06-05 RX ADMIN — AMLODIPINE BESYLATE 10 MG: 10 TABLET ORAL at 08:08

## 2025-06-05 RX ADMIN — LOSARTAN POTASSIUM 50 MG: 50 TABLET ORAL at 13:16

## 2025-06-05 RX ADMIN — METFORMIN HYDROCHLORIDE 500 MG: 500 TABLET ORAL at 08:08

## 2025-06-05 RX ADMIN — POLYETHYLENE GLYCOL 3350 17 G: 17 POWDER, FOR SOLUTION ORAL at 08:07

## 2025-06-05 RX ADMIN — ASPIRIN 81 MG CHEWABLE TABLET 81 MG: 81 TABLET CHEWABLE at 08:08

## 2025-06-05 RX ADMIN — EMPAGLIFLOZIN 25 MG: 25 TABLET, FILM COATED ORAL at 08:08

## 2025-06-05 ASSESSMENT — COGNITIVE AND FUNCTIONAL STATUS - GENERAL
CLIMB 3 TO 5 STEPS WITH RAILING: 2 - A LOT
WALKING IN HOSPITAL ROOM: 3 - A LITTLE
STANDING UP FROM CHAIR USING ARMS: 2 - A LOT
MOVING TO AND FROM BED TO CHAIR: 2 - A LOT

## 2025-06-05 NOTE — PLAN OF CARE
Care Coordination Discharge Plan Summary    Admission Assessment Summary    General Information  Readmission Within the last 30 days: no previous admission in last 30 days  Does patient have a :    Patient-Specific Goals (include timeframe): Decrease pain    Living Arrangements  Arrived From: home  Current Living Arrangements: home  People in Home: child(master), adult, spouse  Home Accessibility: railings on inside stairs, stairs within home (Group), stairs to enter home (Group)  Living Arrangement Comments: Resides with spouse, adult son and daughter in 2 story house with 1.5 steps to enter; has 1/2 bathroom on 1st floor, full bathroom and bedroom on 2nd floor.    Social Drivers of Health - Screenings  Housing Stability  In the last 12 months, was there a time when you were not able to pay the mortgage or rent on time?: No  In the past 12 months, how many times have you moved where you were living?: 0  At any time in the past 12 months, were you homeless or living in a shelter (including now)?: No  Utility Access  In the past 12 months has the electric, gas, oil, or water company threatened to shut off services in your home?: No  Transportation Needs  In the past 12 months, has lack of transportation kept you from medical appointments or from getting medications?: No  In the past 12 months, has lack of transportation kept you from meetings, work, or from getting things needed for daily living?: No    Functional Status Prior to Admission  Assistive Device/Animal Currently Used at Home: cane, quad, grab bar  Functional Status Comments: Reports independent with ADLs prior to admission, was not using assistive device for ambulation prior to admission; spouse and daughter are emergency contacts; POA is spouse.  IADL Comments: Reports independent with IADls prior to admission; spouse typically does the cooking but reports he is able when she is not home. Was driving prior to admission.    Discharge Needs  Assessment    Concerns to be Addressed: care coordination/care conferences, discharge planning  Current Discharge Risk: none  Anticipated Changes Related to Illness: none    Discharge Plan Summary    Patient Choice  Offered/Gave Vendor List: yes (reviewed list of facilities. Patient and spouse choose [facility])  Patient and/or patient guardian/advocate was made aware of their right to choose a provider. A list of eligible providers was presented and reviewed with the patient and/or patient guardian/advocate in written and/or verbal form. The list delineates providers in the patient’s desired geographic area who are participating in the Medicare program and/or providers contracted with the patient’s primary insurance. The Medicare list and quality ratings were obtained from the Medicare.gov [medicare.gov] website.    Concerns / Comments: Patient cleared for d/c today and prefers [facility].  Spoke with Laina in admissions [phone]. She has accepted patient for admission and started authorization with [facility] yesterday 6/4.   [facility] ([facility] number [id number]).  Met with patient and spouse on 6/4 and reviewed private cost of transport.  Both agreeable to plan and cost. Met with patient at bedside this am to review d/c. Called spouse with update. Updated team.  IMM completed.  Will need to change transport time if [facility] not obtained.    Discharge Plan:  Disposition/Destination: Skilled Nursing Facility - Other  / Skilled Nursing Facility      Connection to Community  Not applicable  Community Resources:      Discharge Transportation:  Is Out of Hospital DNR needed at Discharge: no  Does patient need discharge transport? Yes  Discharge Transportation Vendor: [facility] ([phone])  Type of Transportation: wheelchair van  What day is the transport expected?: 06/05/25  What time is the transport expected?: 1500    Care Coordinator  explained the costs associated w/ [facility] transport (based on vendor, payment method, and current mileage rates). Pt expressed understanding and stated they are agreeable to paying for [facility] transport.

## 2025-06-05 NOTE — ASSESSMENT & PLAN NOTE
-  Continue Amlodipine 10mg daily   - Continue Terazosin  - Continue Losartan as sub for Olmesartan

## 2025-06-05 NOTE — PROGRESS NOTES
Patient had 2 large BM after enema and obstruction series is negative. Medically stable for discharge.  time will be 7:30 this evening per d/w social work and nursing.

## 2025-06-05 NOTE — ASSESSMENT & PLAN NOTE
- Moderate AS on Echo Jan 2024. Known to [facility]    - Continue routine surveillance  - Continue BP management

## 2025-06-05 NOTE — DISCHARGE SUMMARY
Ortho Discharge Summary    Admitting Provider: Blaise Huber MD  Discharge Provider: Blaise Huber MD  Primary Care Physician at Discharge: Fly Patel PA C [phone]     Admission Date: 6/2/2025     Discharge Date: 6/5/2025    Primary Discharge Diagnosis  History of total left knee replacement    Secondary Discharge Diagnosis      Discharge Disposition  Skilled Nursing Facility - Other   Code Status at Discharge: Full Code    Discharge Medications     Medication List        START taking these medications      acetaminophen 325 mg tablet  Commonly known as: TylenoL  Take 2 tablets (650 mg total) by mouth every 6 (six) hours as needed for mild pain. Please obtain over the counter  Dose: 650 mg     aspirin 81 mg chewable tablet  Take 1 tablet (81 mg total) by mouth 2 (two) times a day.  Dose: 81 mg     sennosides-docusate sodium 8.6-50 mg  Commonly known as: SENOKOT-S  Take 1 tablet by mouth 2 (two) times a day.  Dose: 1 tablet     traMADoL 50 mg tablet  Commonly known as: ULTRAM  Take 0.5-1 tablets (25-50 mg total) by mouth every 4 (four) hours as needed for pain for up to 5 days.  Dose: 25-50 mg            CONTINUE taking these medications      amlodipine-olmesartan 10-40 mg per tablet  Commonly known as: THANG  Take 1 tablet by mouth every morning.  Dose: 1 tablet     DULoxetine 60 mg capsule  Commonly known as: CYMBALTA  Take 60 mg by mouth nightly.  Dose: 60 mg     ezetimibe 10 mg tablet  Commonly known as: ZETIA  Take 10 mg by mouth daily.  Dose: 10 mg     insulin glargine U-100 100 unit/mL (3 mL) pen  Commonly known as: LANTUS/BASAGLAR  Inject 18 Units under the skin nightly.  Dose: 18 Units     JARDIANCE 25 mg tablet  Take 25 mg by mouth every morning.  Dose: 25 mg  Generic drug: empagliflozin     LASIX 20 mg tablet  Take 20 mg by mouth every morning. For 3 days  Dose: 20 mg  Generic drug: furosemide     metFORMIN 500 mg tablet  Commonly known as: GLUCOPHAGE  Take 500 mg by mouth 2 (two) times a day  with breakfast and dinner.  Dose: 500 mg     terazosin 2 mg capsule  Commonly known as: HYTRIN  Take 6 mg by mouth nightly.  Dose: 6 mg     VYZULTA 0.024 % drops ophthalmic drops  Administer 1 drop into both eyes nightly.  Dose: 1 drop  Generic drug: latanoprostene bunod              Active Issues Requiring Follow-up      Outpatient Follow-Up  Encounter Information    This patient does not currently have any appointments scheduled.           Test Results Pending at Discharge  Unresulted Labs (From admission, onward)      None            DETAILS OF HOSPITAL STAY    Presenting Problem/History of Present Illness  Primary osteoarthritis of left knee [M17.12]  Knee pain [M25.569]  History of total left knee replacement [Z96.652]      Hospital Course  The pt underwent the procedure after being medically cleared and tolerated it well. He was transferred to [facility] for continued post-op care. He had an unremarkable hospital course that consisted of Medical and Cardiac management, PT/OT, pain management, and DVT prophylaxis. By day of D/C, he was tolerating his diet, voiding without issue, and pain was controlled. He was cleared from Orthopedic, Cardiac, and PT/OT standpoints and was D/C'ed to SNF without issue.    Operative Procedures Performed  Procedure(s):  KNEE ARTHROPLASTY TOTAL  Consults: MARIA DEL CARMENG

## 2025-06-05 NOTE — PLAN OF CARE
Care Coordination Discharge Plan Summary    Admission Assessment Summary    General Information  Readmission Within the last 30 days: no previous admission in last 30 days  Does patient have a :    Patient-Specific Goals (include timeframe): Decrease pain    Living Arrangements  Arrived From: home  Current Living Arrangements: home  People in Home: child(master), adult, spouse  Home Accessibility: railings on inside stairs, stairs within home (Group), stairs to enter home (Group)  Living Arrangement Comments: Resides with spouse, adult son and daughter in 2 story house with 1.5 steps to enter; has 1/2 bathroom on 1st floor, full bathroom and bedroom on 2nd floor.    Social Drivers of Health - Screenings  Housing Stability  In the last 12 months, was there a time when you were not able to pay the mortgage or rent on time?: No  In the past 12 months, how many times have you moved where you were living?: 0  At any time in the past 12 months, were you homeless or living in a shelter (including now)?: No  Utility Access  In the past 12 months has the electric, gas, oil, or water company threatened to shut off services in your home?: No  Transportation Needs  In the past 12 months, has lack of transportation kept you from medical appointments or from getting medications?: No  In the past 12 months, has lack of transportation kept you from meetings, work, or from getting things needed for daily living?: No    Functional Status Prior to Admission  Assistive Device/Animal Currently Used at Home: cane, quad, grab bar  Functional Status Comments: Reports independent with ADLs prior to admission, was not using assistive device for ambulation prior to admission; spouse and daughter are emergency contacts; POA is spouse.  IADL Comments: Reports independent with IADls prior to admission; spouse typically does the cooking but reports he is able when she is not home. Was driving prior to admission.    Discharge Needs  Assessment    Concerns to be Addressed: care coordination/care conferences, discharge planning  Current Discharge Risk: none  Anticipated Changes Related to Illness: none    Discharge Plan Summary    Patient Choice  Offered/Gave Vendor List: yes (reviewed list of facilities. Patient and spouse choose [facility])  Patient and/or patient guardian/advocate was made aware of their right to choose a provider. A list of eligible providers was presented and reviewed with the patient and/or patient guardian/advocate in written and/or verbal form. The list delineates providers in the patient’s desired geographic area who are participating in the Medicare program and/or providers contracted with the patient’s primary insurance. The Medicare list and quality ratings were obtained from the Medicare.gov [medicare.gov] website.    Concerns / Comments: Patient cleared for d/c today and prefers [facility].  Spoke with Laina in admissions [phone]. She has accepted patient for admission and started authorization with [facility] yesterday 6/4.  ANTOINETTE set patient up with w/c van transport through [facility] for 1500 (trip number [phone]).  Met with patient and spouse on 6/4 and reviewed private cost of transport.  Both agreeable to plan and cost. Met with patient at bedside this am to review d/c. Called spouse with update. Updated team.  IMM completed.  Will need to change transport time if [facility] not obtained.    Spoke with Laina and authorization obtained.      Notified from RN that when she called in report, she was told that patient will need to have BM or obstruction series prior to coming to facility.  ANTOINETTE pushed out transport to 730pm (spoke with Ruby at [facility]), and updated team who will order ob series and provide enema.  Updated Laina at [facility] ([phone]). If d/c needs to be cancelled, provided RN number of [facility] at [phone]. Laina lindquist [place] states that if patient does not come  tonight, bed will be available tomorrow as well. Updated spouse.    Discharge Plan:  Disposition/Destination: Skilled Nursing Facility - Other  / Skilled Nursing Facility  Destination - Admitted Since 6/2/2025       Service Provider Services Address Phone Fax Patient Preferred Last Updated    [facility] [address] [address] [phone] -- Perla Berman [place], LCSW 6/5/2025 1204            Connection to Community  Not applicable  Community Resources:      Discharge Transportation:  Is Out of Hospital DNR needed at Discharge: no  Does patient need discharge transport? Yes  Discharge Transportation Vendor: [employer] ([phone])  Type of Transportation: wheelchair van  What day is the transport expected?: 06/05/25  What time is the transport expected?: 1500    Care Coordinator explained the costs associated w/ [facility] transport (based on vendor, payment method, and current mileage rates). Pt expressed understanding and stated they are agreeable to paying for [facility] transport.

## 2025-06-05 NOTE — PROGRESS NOTES
Inpatient Cardiology   Daily Progress Note       Overview:     - Medically stable for d/c to SNF pending clearance from PT and ortho        Assessment/Plan   Assessment & Plan  Aortic stenosis, moderate  - Moderate AS on Echo Jan 2024. Known to [facility]    - Continue routine surveillance  - Continue BP management   BPH with urinary obstruction  - Continue Terazosin.   - Monitor I&O; PVR/bladder scan if needed  Stage 3a chronic kidney disease (CMS/[facility])  - Follow renal function post op  - Avoid nephrotoxins  Obstructive sleep apnea  - Does not use CPAP    - EtC02 monitoring per protocol   - Monitor for signs or symptoms of hypercapnea   - Aggressive pulmonary toilet  - GERD/aspiration precautions    Mixed hyperlipidemia  - Resume Zetia when ok with ortho   Essential hypertension  -  Continue Amlodipine 10mg daily   - Continue Terazosin  - Continue Losartan as sub for Olmesartan   Type 2 diabetes mellitus with chronic kidney disease, with long-term current use of insulin (CMS/[facility])  - Continue Jardiance and Metformin BID  - Lantus 18 units qHS  - Accuchecks and SSIC PRN  - Resume Mounjaro on d/c  Primary osteoarthritis of left knee  - S/P left total knee arthroplasty on 6/2/25    - Pain management per ortho - adjusted on 6/3 due to confusion after med administration. Resolved and now on Tramadol  - Avoid over-sedation.   - DVT prophylaxis  - Pulmonary toilet  - Ambulate  - Hold all natural supplements. Resume post op when ok to do so per ortho           Subjective   Seen and examined. Wants to go home and unhappy about SNF. Knee pain controlled. Otherwise no complaints.       Current Medications:    Scheduled:   amLODIPine  10 mg oral Daily    aspirin  81 mg oral BID    DULoxetine  60 mg oral Nightly    empagliflozin  25 mg oral q AM    insulin glargine U-100  18 Units subcutaneous Nightly    insulin lispro U-100  6-10 Units subcutaneous With meals & nightly    latanoprost  1 drop Both Eyes Nightly     losartan  50 mg oral Daily    metFORMIN  500 mg oral BID with breakfast and dinner    pantoprazole  40 mg oral Daily before breakfast    polyethylene glycol  17 g oral Daily    sennosides-docusate sodium  1 tablet oral BID    terazosin  6 mg oral Nightly       Infusions:      PRN:    alum-mag hydroxide-simeth    glucose **OR** dextrose **OR** glucagon **OR** dextrose 50 % in water (D50)    diphenhydrAMINE **OR** diphenhydrAMINE    ondansetron ODT **OR** ondansetron    tiZANidine    traMADoL     Objective   Vital signs in last 24 hours:  Temp:  [35.8 °C (96.5 °F)-37.2 °C (98.9 °F)] 35.8 °C (96.5 °F)  Heart Rate:  [67-93] 92  Resp:  [18-20] 19  BP: (113-191)/(55-83) 189/83    Weights (last 5 days)       Date/Time Weight    06/02/25 1700 106 kg (234 lb 9.1 oz)    06/02/25 0834 105 kg (232 lb)          No intake or output data in the 24 hours ending 06/05/25 0659  Net IO Since Admission: -250 mL [06/05/25 1052]    Physical Exam  Vitals and nursing note reviewed.   HENT:      Head: Normocephalic.      Mouth/Throat:      Mouth: Mucous membranes are moist.   Eyes:      General: No scleral icterus.  Cardiovascular:      Rate and Rhythm: Normal rate and regular rhythm.      Heart sounds: Murmur (II/VI AKBAR at LSB with radiation to apex) heard.   Pulmonary:      Effort: Pulmonary effort is normal. No respiratory distress.      Breath sounds: No wheezing.   Abdominal:      General: There is no distension.      Palpations: Abdomen is soft.      Tenderness: There is no abdominal tenderness.   Musculoskeletal:      Right lower leg: No edema.      Left lower leg: No edema.   Skin:     General: Skin is warm and dry.   Neurological:      Mental Status: He is alert and oriented to person, place, and time.   Psychiatric:         Mood and Affect: Mood normal.              Labs and Data:      Hematology    Results from last 7 days   Lab Units 06/04/25  0501 06/03/25  0400   WBC K/uL 8.12 9.58   HEMOGLOBIN g/dL 10.7* 12.0*   HEMATOCRIT %  31.7* 34.6*   PLATELETS K/uL 169 200       Chemistries    Results from last 7 days   Lab Units 06/04/25  0501 06/03/25  0400   SODIUM mEQ/L 137 133*   POTASSIUM mEQ/L 3.9 4.2   CHLORIDE mEQ/L 106 101   CREATININE mg/dL 1.1 1.2   BUN mg/dL 20 20   CO2 mEQ/L 27 27   GLUCOSE mg/dL 81 164*   CALCIUM mg/dL 9.9 9.4        Radiology:  I have independently reviewed the pertinent imaging from the last 24 hrs.    X-RAY KNEE LEFT 1 OR 2 VIEWS  Result Date: 6/2/2025  IMPRESSION: Please see comment.                         LORENA Hathc  6/5/2025

## 2025-06-05 NOTE — NURSING NOTE
Fleets enema ordered and given.  Effective results, 2 large hard formed stools.  Pt going for OBS now.

## 2025-06-05 NOTE — PROGRESS NOTES
Notified by nursing that SNF will not accept patient w/o evidence of BM or negative obstruction series.  is slated for 1500. He has + bowel sounds, + flatus, and last BM was 6/1. Stat enema and obstruction series.  WC van changed to 1700 pickup. Updated nursing, SW, ortho.

## 2025-06-05 NOTE — PROGRESS NOTES
S  Pt seen and examined at beside. reports pain well controlled.  Denies any numbness or tingling     O  A+Ox3, AVSS except htn NAD  +Q/DF/PF/EHL  + DP/PT b/l  capillary refill brisk  SILT  compartments soft, nontender  Left knee dressing intact with small amount of drainage not extending to the borders     A  75 y/o male s/o left TKA with Dr. Huber on 6/2/2025     P  DVT ppx: multimodal- SCDS & aspirin 81 mg po bid x 4 weeks  Pain management  WBAT  PT/OT  Medical management per [facility]  Plan for d/c today SNF pending clearances/placement      Ortho addendum- plan for d/c despite elevated bp's (have been elevated throughout hospital stay) Dose or losartan ordered per cardiology, per [facility] medically stable for d/ SNF

## 2025-06-05 NOTE — PLAN OF CARE
Plan of Care Review  Plan of Care Reviewed With: patient  Progress: improving  Outcome Evaluation: Pt ambulated to BR several times to void.Knee incision is CDI with aquacel.Pt is preparing for d/c to SNF after he has bowel movement.Pain is managed with tramadol.

## (undated) DEVICE — GLOVE PROTEXIS PI ORTHO 8.0

## (undated) DEVICE — NEEDLE HYPODERMIC PRO 18G X 1 1/2 IN

## (undated) DEVICE — SYRINGE DISP LUER-LOK 30 CC

## (undated) DEVICE — SUTURE POLYSORB 1 UNDYED 1X30 GS-13

## (undated) DEVICE — BLADE SCALPEL #20

## (undated) DEVICE — HANDLE LIGHT COVER STERILE

## (undated) DEVICE — SYSTEM LABELING CORRECT MEDICATION

## (undated) DEVICE — FLOOR MAT 28" X 56" ABSORBANT WHITE/BLUE

## (undated) DEVICE — SUTURE QUILL PDO 2 RX-1066Q

## (undated) DEVICE — DRESSING AQUACEL AG 12 INCH

## (undated) DEVICE — GLOVE SZ 8 LINER PROTEXIS PI BL

## (undated) DEVICE — STRAP POSITIONING 5X72" ONE PIECE DISP

## (undated) DEVICE — APPLICATOR CHLORAPREP 26ML ORANGE TINT

## (undated) DEVICE — ADHESIVE SKIN DERMABOND ADVANCED 0.7ML

## (undated) DEVICE — TUBING SMOKE EVAC PENCIL COATED

## (undated) DEVICE — BLADE SAGITTAL DUAL CUT 4125-127-090

## (undated) DEVICE — PACK TOTAL KNEE

## (undated) DEVICE — CLOTH PREPPING SAGE 2% CHG 2/PK

## (undated) DEVICE — SUTURE 2-0 STRATAFIX MONO PLUS 36X36CM CT-1 UNDYED

## (undated) DEVICE — MANIFOLD FOUR PORT NEPTUNE

## (undated) DEVICE — TOWEL SURGICAL W17XL27IN BLUE COTTON STANDARD PREWASHED DELI

## (undated) DEVICE — BLADE SAGITTAL EXTRA-WIDE THIN SHORT

## (undated) DEVICE — SOLUTION PROV-IODINE .75 OZ

## (undated) DEVICE — BLADE SAGITTAL #152 STRYKER NARROW THICK NO OFFSET 12.5MM

## (undated) DEVICE — SOLN IRRIG .9%SOD 1000ML

## (undated) DEVICE — HANDPIECE SUCTION INTERPULSE W/BONE CLEANING TIP